# Patient Record
Sex: MALE | Race: WHITE | NOT HISPANIC OR LATINO | Employment: OTHER | URBAN - METROPOLITAN AREA
[De-identification: names, ages, dates, MRNs, and addresses within clinical notes are randomized per-mention and may not be internally consistent; named-entity substitution may affect disease eponyms.]

---

## 2017-09-07 ENCOUNTER — ALLSCRIPTS OFFICE VISIT (OUTPATIENT)
Dept: OTHER | Facility: OTHER | Age: 67
End: 2017-09-07

## 2017-09-07 DIAGNOSIS — R19.7 DIARRHEA: ICD-10-CM

## 2017-10-12 ENCOUNTER — ANESTHESIA EVENT (OUTPATIENT)
Dept: GASTROENTEROLOGY | Facility: HOSPITAL | Age: 67
End: 2017-10-12
Payer: OTHER GOVERNMENT

## 2017-10-13 ENCOUNTER — GENERIC CONVERSION - ENCOUNTER (OUTPATIENT)
Dept: OTHER | Facility: OTHER | Age: 67
End: 2017-10-13

## 2017-10-13 ENCOUNTER — ANESTHESIA (OUTPATIENT)
Dept: GASTROENTEROLOGY | Facility: HOSPITAL | Age: 67
End: 2017-10-13
Payer: OTHER GOVERNMENT

## 2017-10-13 ENCOUNTER — HOSPITAL ENCOUNTER (OUTPATIENT)
Facility: HOSPITAL | Age: 67
Setting detail: OUTPATIENT SURGERY
Discharge: HOME | End: 2017-10-13
Attending: INTERNAL MEDICINE | Admitting: INTERNAL MEDICINE
Payer: OTHER GOVERNMENT

## 2017-10-13 VITALS
HEART RATE: 66 BPM | RESPIRATION RATE: 16 BRPM | SYSTOLIC BLOOD PRESSURE: 132 MMHG | DIASTOLIC BLOOD PRESSURE: 69 MMHG | OXYGEN SATURATION: 95 %

## 2017-10-13 DIAGNOSIS — K21.9 GERD (GASTROESOPHAGEAL REFLUX DISEASE): ICD-10-CM

## 2017-10-13 LAB — GLUCOSE SERPL-MCNC: 183 MG/DL (ref 65–140)

## 2017-10-13 PROCEDURE — 88342 IMHCHEM/IMCYTCHM 1ST ANTB: CPT | Performed by: INTERNAL MEDICINE

## 2017-10-13 PROCEDURE — 88305 TISSUE EXAM BY PATHOLOGIST: CPT | Performed by: INTERNAL MEDICINE

## 2017-10-13 PROCEDURE — 88341 IMHCHEM/IMCYTCHM EA ADD ANTB: CPT | Performed by: INTERNAL MEDICINE

## 2017-10-13 PROCEDURE — 88313 SPECIAL STAINS GROUP 2: CPT | Performed by: INTERNAL MEDICINE

## 2017-10-13 PROCEDURE — 82948 REAGENT STRIP/BLOOD GLUCOSE: CPT

## 2017-10-13 RX ORDER — PROPOFOL 10 MG/ML
INJECTION, EMULSION INTRAVENOUS AS NEEDED
Status: DISCONTINUED | OUTPATIENT
Start: 2017-10-13 | End: 2017-10-13 | Stop reason: SURG

## 2017-10-13 RX ORDER — SODIUM CHLORIDE, SODIUM LACTATE, POTASSIUM CHLORIDE, CALCIUM CHLORIDE 600; 310; 30; 20 MG/100ML; MG/100ML; MG/100ML; MG/100ML
125 INJECTION, SOLUTION INTRAVENOUS CONTINUOUS
Status: DISCONTINUED | OUTPATIENT
Start: 2017-10-13 | End: 2017-10-13 | Stop reason: HOSPADM

## 2017-10-13 RX ORDER — PROPOFOL 10 MG/ML
INJECTION, EMULSION INTRAVENOUS CONTINUOUS PRN
Status: DISCONTINUED | OUTPATIENT
Start: 2017-10-13 | End: 2017-10-13 | Stop reason: SURG

## 2017-10-13 RX ORDER — SIMVASTATIN 10 MG
10 TABLET ORAL
COMMUNITY

## 2017-10-13 RX ORDER — FUROSEMIDE 20 MG/1
20 TABLET ORAL 2 TIMES DAILY
COMMUNITY

## 2017-10-13 RX ORDER — SODIUM CHLORIDE 9 MG/ML
INJECTION, SOLUTION INTRAVENOUS CONTINUOUS PRN
Status: DISCONTINUED | OUTPATIENT
Start: 2017-10-13 | End: 2017-10-13 | Stop reason: SURG

## 2017-10-13 RX ADMIN — PROPOFOL 20 MG: 10 INJECTION, EMULSION INTRAVENOUS at 08:00

## 2017-10-13 RX ADMIN — PROPOFOL 60 MG: 10 INJECTION, EMULSION INTRAVENOUS at 07:53

## 2017-10-13 RX ADMIN — PROPOFOL 40 MG: 10 INJECTION, EMULSION INTRAVENOUS at 07:57

## 2017-10-13 RX ADMIN — PROPOFOL 120 MCG/KG/MIN: 10 INJECTION, EMULSION INTRAVENOUS at 08:02

## 2017-10-13 RX ADMIN — PROPOFOL 40 MG: 10 INJECTION, EMULSION INTRAVENOUS at 07:55

## 2017-10-13 RX ADMIN — SODIUM CHLORIDE: 0.9 INJECTION, SOLUTION INTRAVENOUS at 07:49

## 2017-10-13 NOTE — OP NOTE
**** GI/ENDOSCOPY REPORT ****     PATIENT NAME: Savannah Lechuga - VISIT ID:  Patient ID: [id number]   YOB: 1950     INTRODUCTION: Esophagogastroduodenoscopy - A 74 male patient presents for   an outpatient Esophagogastroduodenoscopy at [facility]  INDICATIONS: Diffuse generalized abdominal pain  CONSENT: The benefits, risks, and alternatives to the procedure were   discussed and informed consent was obtained from the patient  PREPARATION:  EKG, pulse, pulse oximetry and blood pressure were monitored   throughout the procedure  MEDICATIONS: MAC anesthesia  PROCEDURE:  The endoscope was passed without difficulty through the mouth   under direct visualization and advanced to the 2nd portion of the   duodenum  The scope was withdrawn and the mucosa was carefully examined  FINDINGS:   Esophagus: The esophagus appeared to be normal   GE junction:   The GE junction appeared to be normal   Stomach: A small benign polyp was   found arising from the greater curvature of the stomach body  Mild   non-erosive gastritis was found in the stomach  Multiple biopsies was   taken from the antrum and body of the stomach  Pylorus: The pylorus   appeared to be normal, symmetrical, and patent  Duodenum: The duodenal   bulb and 2nd portion of the duodenum appeared to be normal  Five random   biopsies was taken from the duodenal bulb and 2nd portion of the duodenum  COMPLICATIONS: There were no complications  IMPRESSIONS: Normal esophagus  Normal GE junction  Polyp arising from the   greater curvature of the stomach body  Mild non-erosive gastritis found  Multiple biopsies taken  Normal, symmetrical, and patent pylorus  Normal   duodenal bulb and 2nd portion of the duodenum  RECOMMENDATIONS: Avoid all non-steroidal anti-inflammatory drugs (NSAID's)   including but not limited to Ibuprofen, Advil, Motrin, and Nuprin     Follow-up on the results of the biopsy specimens  ESTIMATED BLOOD LOSS:     PATHOLOGY SPECIMENS: Multiple biopsies taken from antrum and body of the   stomach  Associated finding: Gastritis  Five random biopsies taken from   the duodenal bulb and 2nd portion of the duodenum  PROCEDURE CODES: 98710 - EGD flexible; with biopsy     ICD-9 Codes: 789 00 Abdominal pain, unspecified site 211 1 Benign neoplasm   of stomach     ICD-10 Codes: R10 Abdominal and pelvic pain N04 6 Neoplasm of uncertain   behavior of stomach K29 Gastritis and duodenitis     PERFORMED BY: MABEL Bravo  on 10/13/2017  Version 1, electronically signed by MABEL Eagle  on 10/13/2017 at   08:04

## 2017-10-13 NOTE — ANESTHESIA PREPROCEDURE EVALUATION
Review of Systems/Medical History          Cardiovascular  Hypertension , Past MI ,    Pulmonary  Sleep apnea , ,        GI/Hepatic            Endo/Other  Diabetes ,      GYN       Hematology   Musculoskeletal       Neurology   Psychology           Physical Exam    Airway    Mallampati score: II         Dental   No notable dental hx     Cardiovascular      Pulmonary      Other Findings        Anesthesia Plan  ASA Score- 3       Anesthesia Type- IV sedation with anesthesia with ASA Monitors  Additional Monitors:   Airway Plan:     Comment: I, Dr Junior Kitchen, the attending physician, have personally seen and evaluated the patient prior to anesthetic care  I have reviewed the pre-anesthetic record, and other medical records if appropriate to the anesthetic care  If a CRNA is involved in the case, I have reviewed the CRNA assessment, if present, and agree  The patient is in a suitable condition to proceed with my formulated anesthetic plan          Induction- intravenous  Informed Consent- Anesthetic plan and risks discussed with patient  I personally reviewed this patient with the CRNA  Discussed and agreed on the Anesthesia Plan with the CRNA  Komal Boo

## 2017-10-13 NOTE — OP NOTE
**** GI/ENDOSCOPY REPORT ****     PATIENT NAME: Donovan Goodpasture ------ VISIT ID:  Patient ID:   [id number] YOB: 1950     INTRODUCTION: Colonoscopy - A 74 male patient presents for an outpatient   Colonoscopy at [facility]  PREVIOUS COLONOSCOPY: 3 years ago     INDICATIONS: Abdominal pain  Change in bowel habits  CONSENT:  The benefits, risks, and alternatives to the procedure were   discussed and informed consent was obtained from the patient  PREPARATION: EKG, pulse, pulse oximetry and blood pressure were monitored   throughout the procedure  The patient was identified by myself both   verbally and by visual inspection of ID band  Airway Assessment   Classification: Airway class 2 - Visualization of the soft palate, fauces   and uvula  ASA Classification: Class 2 - Patient has mild to moderate   systemic disturbance that may or may not be related to the disorder   requiring surgery  MEDICATIONS: Anesthesia-check records     PROCEDURE:  The endoscope was passed without difficulty through the anus   under direct visualization and advanced to the cecum, confirmed by   appendiceal orifice and ileocecal valve  The scope was withdrawn and the   mucosa was carefully examined  The quality of the preparation was   excellent  RECTAL EXAM: Normal rectal exam      FINDINGS:  A single polyp, measuring 3 mm in size, was found in the   descending colon  The polyp was completely removed by cold biopsy   polypectomy  The polyp was retrieved  A single polyp, measuring 6 mm in   size, was found in the sigmoid colon  The polyp was completely removed by   cold snare polypectomy  The polyp was retrieved  Five random cold forceps   biopsies was taken from the whole colon  Diminutive internal hemorrhoids   were found  COMPLICATIONS: There were no complications  IMPRESSIONS: A single polyp found in the descending colon; removed by cold   biopsy polypectomy   A single polyp found in the sigmoid colon; removed by   cold snare polypectomy  Internal hemorrhoids  RECOMMENDATIONS:     ESTIMATED BLOOD LOSS:     PATHOLOGY SPECIMENS: Completely removed by cold biopsy polypectomy  Completely removed by cold snare polypectomy  Five cold forceps random   biopsies taken from the whole colon  PROCEDURE CODES: Colonoscopy with biopsy : Colonoscopy with snare   polypectomy     ICD-9 Codes: 789 00 Abdominal pain, unspecified site 787 99 Other symptoms   involving digestive system 211 3 Benign neoplasm of colon 211 3 Benign   neoplasm of colon     ICD-10 Codes: R10 Abdominal and pelvic pain R19 4 Change in bowel habit   K63 5 Polyp of colon K63 5 Polyp of colon K64 9 Unspecified hemorrhoids     PERFORMED BY: MABEL Pierce  on  Version 1, electronically signed by MABEL Rios  on 10/13/2017 at   08:36

## 2017-10-13 NOTE — ANESTHESIA POSTPROCEDURE EVALUATION
Post-Op Assessment Note      CV Status:  Stable    Mental Status:  Alert and awake    Hydration Status:  Stable    PONV Controlled:  Controlled    Airway Patency:  Patent and adequate    Post Op Vitals Reviewed: Yes          Staff: CRNA           BP   118/62   Temp      Pulse  63   Resp   16   SpO2   96

## 2017-10-19 ENCOUNTER — GENERIC CONVERSION - ENCOUNTER (OUTPATIENT)
Dept: OTHER | Facility: OTHER | Age: 67
End: 2017-10-19

## 2017-10-23 DIAGNOSIS — A04.8 OTHER SPECIFIED BACTERIAL INTESTINAL INFECTIONS: ICD-10-CM

## 2017-10-25 NOTE — CONSULTS
Assessment  1  Left upper quadrant pain (789 02) (R10 12)  2  Left lower quadrant pain (789 04) (R10 32)  3  GERD (gastroesophageal reflux disease) (530 81) (K21 9)  4  Acute diarrhea (787 91) (R19 7)    Plan  Acute diarrhea    · (1) C  DIFFICILE TOXIN BY PCR; Status:Active; Requested RWK:[id number];   Perform:[facility]; VKU:[id number]; Ordered; For:Acute diarrhea; Ordered   By:Orlin Farmer;   · (1) CBC/PLT/DIFF; Status:Active; Requested IRN:[id number];   Perform:[facility]; IYZ:[id number]; Ordered; For:Acute diarrhea; Ordered   By:Orlin Farmer;   · (1) COMPREHENSIVE METABOLIC PANEL; Status:Active; Requested SPT:[id number];   Perform:[facility]; VTU:[id number]; Ordered; For:Acute diarrhea; Ordered   By:Orlin Farmer;   · (1) GIARDIA LAMBLIA; Status:Active; Requested CLK:[id number];   Perform:[facility]; SNO:[id number]; Ordered; For:Acute diarrhea; Ordered   By:Orlin Farmer;   · (1) HEP C RNA PCR, QUANTITATIVE; Status:Active; Requested RLS:[id number];   Perform:[facility]; PRD:[id number]; Ordered; For:Acute diarrhea; Ordered   By:Orlin Farmer;   · (1) IGA; Status:Active; Requested GLQ:[id number];   Perform:[facility]; HXJ:[id number]; Ordered; For:Acute diarrhea; Ordered   By:Orlin Farmer;   · (1) OVA AND PARASITES EXAM; Status:Active; Requested WDW:[id number];   Perform:[facility]; IGS:[id number]; Ordered; For:Acute diarrhea; Ordered   By:Orlin Farmer;   · (1) PANCREATIC ELASTASE, FECAL; Status:Active; Requested TUU:[id number];   Perform:[facility]; VJS:[id number]; Ordered; For:Acute diarrhea; Ordered   By:Orlin Farmer;   · (1) STOOL CULTURE; Source:Rectum; Status:Active; Requested UDR:[id number];   Perform:[facility]; SAY:[id number]; Ordered; For:Acute diarrhea; Ordered   By:Orlin Farmer;   · (1) STOOL WBCS; Status:Active; Requested OXE:[id number];   Perform:[facility]; SFF:[id number]; Ordered;  For:Acute diarrhea; Ordered   By:Marleny Orlin;   · (1) TISSUE TRANSGLUTAMINASE IGA; Status:Active; Requested HZS:[id number];   Perform:[facility]; NMZ:[id number]; Ordered; For:Acute diarrhea; Ordered   By:Orlin Farmer;   · (1) TSH; Status:Active; Requested MJO:[id number];   Perform:[facility]; DEJ:[id number]; Ordered; For:Acute diarrhea; Ordered   By:Orlin Farmer;  GERD (gastroesophageal reflux disease)    · Start: Suprep Bowel Prep Kit 17 5-3 13-1 6 GM/180ML Oral Solution; USE AS DIRECTED  Rx By: Calvin Irwin; Dispense: 0 Days ; #:1 ML; Refill: 0;For: GERD (gastroesophageal   reflux disease); ANGELO = N; Record   · COLONOSCOPY (GI, SURG); Status:Hold For - Scheduling; Requested IDL:[id number];   Perform:[facility]; OSR:[id number]; Ordered; For:GERD (gastroesophageal   reflux disease); Ordered By:Orlin Farmer;   · EGD; Status:Hold For - Scheduling; Requested QYI:[id number];   Perform:[facility]; Due:61Zou4750;Ordered; For:GERD (gastroesophageal   reflux disease); Ordered By:Orlin Farmer;    Discussion/Summary  Discussion Summary:   Abdominal pain in setting of change in bowel habits- in the left side of the abdomen DDx- constipation vs  ulcer vs  dyspepsia Plan EGD and colonoscopy for further eval if neg would consider Ct scan of A/PTrial of MiraLax and metamucil when patient has constipation Will obtain meds from [place] And emphasized bringing in cause medication to his next visit  1  of colonic polyps- last colonoscopy 3 years ago due for one in 2 years but will plan for earlier colonoscopy due to worsening abdominal pain   symptoms are managed at this time unclear if he is on PPi therapy   in bowel habits may be related to IBS versus mild constipation with overflow diarrhea versus infectious etiologyAs diarrhea is getting worse with about 10 bowel movements yesterday we'll plan for stool studies and EGD and colonoscopy for further evaluation         1 Amended By: Calvin Irwin; Sep 07 2017 8:59 AM EST    Chief Complaint  Chief Complaint Free Text Note Form: Patient consult  Complains of abdominal pain and constipation/diarrhea  History of Present Illness  HPI: Patient's a 53-year-old male poor historian presents with worsening diarrhea/change in bowel habits, left-sided abdominal pain, and history of colonic polyps  He has had 3 time lifetime colonoscopy evaluation with the last one done 3 years ago which showed colonic polyps  He was asked to come back in 3-5 years  Currently the biggest issue seems to be left-sided abdominal pain which is intermittent in nature which radiates to the epigastric region  Pain not clearly associated with bowel movements but can be triggered by oral intake  He denies any use of any NSAIDs  No overt bleeding  He has had EGD about 6 years ago but the results are not available to us  Currently unsure what the cause of his diarrhea is an also cannot recall his medications  He can have up to 10 bowel movements daily and at times has difficult time with evacuation and straining/constipation  Denies any fevers, sick contacts, recent consultation, recent antibiotic use, nausea, vomiting  No recent CAT scan evaluation  does have history of heartburn which may be related to his high BMI  Currently heartburn is well controlled with these unsure what medication he is taking so he may be on a PPI  Review of Systems  Complete-Male GI Adult:   Constitutional: No fever or chills, feels well, no tiredness, no recent weight gain or weight loss  Eyes: No complaints of eye pain, no red eyes, no discharge from eyes, no itchy eyes  ENT: no complaints of earache, no hearing loss, no nosebleeds, no nasal discharge, no sore throat, no hoarseness  Cardiovascular: No complaints of slow heart rate, no fast heart rate, no chest pain, no palpitations, no leg claudication, no lower extremity  Respiratory: No complaints of shortness of breath, no wheezing, no cough, no SOB on exertion, no orthopnea or PND     Gastrointestinal: abdominal pain,-- constipation-- and-- diarrhea, but-- as noted in HPI  Genitourinary: No complaints of dysuria, no incontinence, no hesitancy, no nocturia, no genital lesion, no testicular pain  Musculoskeletal: No complaints of arthralgia, no myalgias, no joint swelling or stiffness, no limb pain or swelling  Integumentary: No complaints of skin rash or skin lesions, no itching, no skin wound, no dry skin  Neurological: No compliants of headache, no confusion, no convulsions, no numbness or tingling, no dizziness or fainting, no limb weakness, no difficulty walking  Psychiatric: Is not suicidal, no sleep disturbances, no anxiety or depression, no change in personality, no emotional problems  Endocrine: No complaints of proptosis, no hot flashes, no muscle weakness, no erectile dysfunction, no deepening of the voice, no feelings of weakness  Hematologic/Lymphatic: No complaints of swollen glands, no swollen glands in the neck, does not bleed easily, no easy bruising  ROS Reviewed:   ROS reviewed  Past Medical History  1  History of colonic polyps (V12 72) (Z86 010)  Active Problems And Past Medical History Reviewed: The active problems and past medical history were reviewed and updated today  Surgical History  Surgical History Reviewed: The surgical history was reviewed and updated today  Family History  Mother   1  Family history of pancreatic cancer (V16 0) (Z80 0)  Father   2  Family history of gastric ulcer (V18 59) (Z83 79)  Family History Reviewed: The family history was reviewed and updated today  Social History   · Current non-drinker of alcohol (V49 89) (Z78 9)   · Former smoker (I41 17) (L23 106)  Social History Reviewed: The social history was reviewed and updated today  Current Meds  Medication List Reviewed: The medication list was reviewed and updated today  Allergies  1   Penicillins    Vitals  Vital Signs    Recorded: 49Moz9135 08:27AM   Heart Rate 72   Systolic 730, LUE, Sitting   Diastolic 78, LUE, Sitting   BP CUFF SIZE Large   Height 5 ft 8 in   Weight 251 lb    BMI Calculated 38 16   BSA Calculated 2 25   O2 Saturation 97     Physical Exam    Constitutional   General appearance: No acute distress, well appearing and well nourished  Eyes   Conjunctiva and lids: No swelling, erythema, or discharge  Ears, Nose, Mouth, and Throat   External inspection of ears and nose: Normal     Pulmonary   Respiratory effort: No increased work of breathing or signs of respiratory distress  Auscultation of lungs: Clear to auscultation, equal breath sounds bilaterally, no wheezes, no rales, no rhonci  Cardiovascular   Palpation of heart: Normal PMI, no thrills  Auscultation of heart: Normal rate and rhythm, normal S1 and S2, without murmurs  Neurologic   Cranial nerves: Cranial nerves 2-12 intact      Psychiatric   Orientation to person, place and time: Normal          Signatures   Electronically signed by : Latoya Babin MD; Sep  7 2017  8:58AM EST                       (Author)    Electronically signed by : Latoya Babin MD; Sep  7 2017  8:59AM EST                       (Author)

## 2018-01-12 NOTE — RESULT NOTES
Discussion/Summary   Removed polyps were precancerous (adenomas)  Colonoscopy in 5y   Bx of the stomach did identify H pylori  Please have our nurse treat this and follow up for eradication confirmation in the office  Patient also needs EGD in 1 year as intestinal metaplasia (abnormal cells due to H pylori) were identified to makes sure this resolves from H pylori has been treated     Verified Results  (1) TISSUE EXAM 13Oct2017 07:56AM Joya Brown     Test Name Result Flag Reference   LAB AP CASE REPORT (Report)     Surgical Pathology Report             Case: [id number]                   Authorizing Provider: Christi Butler MD      Collected:      10/13/2017 0756        Ordering Location:   [address] [place] [address]   Received:      10/13/2017 [address] [facility]                               Pathologist:      Kirby Yi MD                                 Specimens:  A) - Stomach, Body and antrum  r/o H pylori                              B) - Stomach, Gastric polyp                                      C) - Duodenum, R/O celiac                                       D) - Polyp, Colorectal, Descending colon  cold bx                           E) - Colon, Random biopsy  diarrhea  r/o microscopic colitis                      F) - Polyp, Colorectal, Sigmoid colon  cold snare   LAB AP FINAL DIAGNOSIS (Report)     A  Stomach, body and antrum (biopsy):  - 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   - H pylori are identified on immunohistochemistry stain  - No intestinal metaplasia     B  Stomach polyp (biopsy):  - Acutely and chronically inflamed polypoid mucosa with hyperplastic   changes  - No intestinal metaplasia     C  Duodenum (biopsy):  - Duodenal mucosa with no diagnostic abnormality  - No Marsh lesion  - Negative for dysplasia     D   Descending colon (biopsy):  - Tubular adenoma  - No high grade dysplasia     E  Colon, random (biopsy):  - Colonic mucosa with reactive lymphoid aggregate  - No acute, chronic or microscopic colitis  - Negative for dysplasia     F  Sigmoid colon, polyp (polypectomy):  - Tubular adenoma  - No high grade dysplasia       Electronically signed by Shree Varma MD on 10/18/2017 at 11:00 AM   LAB AP SURGICAL ADDITIONAL INFORMATION (Report)     All controls performed with the immunohistochemical stains reported above   reacted appropriately  These tests were developed and their performance   characteristics determined by [facility] or   [facility]  They may not be cleared or approved by the [place]  Food and Drug Administration  The FDA has determined that such clearance   or approval is not necessary  These tests are used for clinical purposes  They should not be regarded as investigational or for research  This   laboratory has been approved by [place] [id number], designated as a high-complexity   laboratory and is qualified to perform these tests  LAB AP GROSS DESCRIPTION (Report)     A  The specimen is received in formalin, labeled with the patient's name   and medical record number, and is designated body and antrum rule out H    pylori   The specimen consists of 3 rubbery tan tissue fragments each   measuring up to 0 2 cm in greatest dimension  Entirely submitted  One   cassette  B  The specimen is received in formalin, labeled with the patient's name   and medical record number, and is designated gastric polyp  The   specimen consists of a single, rubbery, tan-brown tissue fragment   measuring up to 0 2 cm in greatest dimension  Entirely submitted  One   cassette  C  The specimen is received in formalin, labeled with the patient's name   and medical record number, and is designated duodenum rule out celiac     The specimen consists of 3 rubbery tan tissue fragments measuring from   0 2 up to 0 3 cm in greatest dimension  Entirely submitted  One cassette  D   The specimen is received in formalin, labeled with the patient's name   and medical record number, and is designated descending colon biopsy  The specimen consists of several, rubbery, tan-brown tissue fragments   measuring 0 6 x 0 6 x 0 1 cm in aggregate dimension  Entirely submitted  One cassette  E  The specimen is received in formalin, labeled with the patient's name   and medical record number, and is designated random biopsy diarrhea rule   out microscopic colitis  The specimen consists of several, rubbery,   tan-brown tissue fragments measuring 0 5 x 0 4 x 0 1 cm in aggregate   dimension  Entirely submitted  One cassette  F  The specimen is received in formalin, labeled with the patient's name   and medical record number, and is designated   The specimen consists of   a single, rubbery, tan-brown tissue fragment measuring up to 0 3 cm in   greatest dimension  Entirely submitted  One cassette  Note: The estimated total formalin fixation time based upon information   provided by the submitting clinician and the standard processing schedule   is less than 72 hours  SRS   LAB AP CLINICAL INFORMATION (Report)     EGD  FINDINGS:  Esophagus: The esophagus appeared to be normal  GE junction:   The GE junction appeared to be normal  Stomach: A small benign polyp was   found arising from the greater curvature of the stomach body  Mild   non-erosive gastritis was found in the stomach  Multiple biopsies was   taken from the antrum and body of the stomach  Pylorus: The pylorus   appeared to be normal, symmetrical, and patent  Duodenum: The duodenal   bulb and 2nd portion of the duodenum appeared to be normal  Five random   biopsies was taken from the duodenal bulb and 2nd portion of the duodenum         Plan  Intestinal metaplasia of gastric mucosa    · EGD; Status:Hold For - Scheduling; Requested IRVIN:[id number];

## 2018-01-14 VITALS
HEART RATE: 72 BPM | WEIGHT: 251 LBS | SYSTOLIC BLOOD PRESSURE: 144 MMHG | OXYGEN SATURATION: 97 % | HEIGHT: 68 IN | DIASTOLIC BLOOD PRESSURE: 78 MMHG | BODY MASS INDEX: 38.04 KG/M2

## 2019-03-04 ENCOUNTER — TELEPHONE (OUTPATIENT)
Dept: GASTROENTEROLOGY | Facility: MEDICAL CENTER | Age: 69
End: 2019-03-04

## 2019-03-04 NOTE — TELEPHONE ENCOUNTER
Dr Farmer's patient called with bill questions from the office  Patient stated that it should be sent to [facility]   Patient can be reached at [phone]

## 2019-03-11 NOTE — TELEPHONE ENCOUNTER
I called Hospital billing, the claim for 10/13/17 was paid by [place] [phone], and balance was written off [phone]  Pt  Has a 0 00 balance  I called and left message for pt, he owes nothing

## 2019-04-23 ENCOUNTER — HOSPITAL ENCOUNTER (EMERGENCY)
Facility: HOSPITAL | Age: 69
Discharge: LEFT BEFORE DISCHARGE | End: 2019-04-23
Attending: EMERGENCY MEDICINE
Payer: OTHER GOVERNMENT

## 2019-04-23 ENCOUNTER — APPOINTMENT (EMERGENCY)
Dept: RADIOLOGY | Facility: HOSPITAL | Age: 69
End: 2019-04-23
Payer: OTHER GOVERNMENT

## 2019-04-23 VITALS
TEMPERATURE: 97.8 F | WEIGHT: 257.06 LBS | BODY MASS INDEX: 39.09 KG/M2 | RESPIRATION RATE: 24 BRPM | HEART RATE: 60 BPM | OXYGEN SATURATION: 95 % | SYSTOLIC BLOOD PRESSURE: 202 MMHG | DIASTOLIC BLOOD PRESSURE: 89 MMHG

## 2019-04-23 DIAGNOSIS — R06.02 SHORTNESS OF BREATH: Primary | ICD-10-CM

## 2019-04-23 DIAGNOSIS — I16.0 HYPERTENSIVE URGENCY: ICD-10-CM

## 2019-04-23 DIAGNOSIS — I10 UNCONTROLLED HYPERTENSION: ICD-10-CM

## 2019-04-23 DIAGNOSIS — Z91.19 NON COMPLIANCE WITH MEDICAL TREATMENT: ICD-10-CM

## 2019-04-23 DIAGNOSIS — E66.9 OBESITY: ICD-10-CM

## 2019-04-23 DIAGNOSIS — Z76.0 MEDICATION REFILL: ICD-10-CM

## 2019-04-23 LAB
BACTERIA UR QL AUTO: ABNORMAL /HPF
BASOPHILS # BLD AUTO: 0.02 THOUSANDS/ΜL (ref 0–0.1)
BASOPHILS NFR BLD AUTO: 0 % (ref 0–1)
BILIRUB UR QL STRIP: NEGATIVE
CLARITY UR: CLEAR
COLOR UR: YELLOW
EOSINOPHIL # BLD AUTO: 0.18 THOUSAND/ΜL (ref 0–0.61)
EOSINOPHIL NFR BLD AUTO: 3 % (ref 0–6)
ERYTHROCYTE [DISTWIDTH] IN BLOOD BY AUTOMATED COUNT: 14.3 % (ref 11.6–15.1)
GLUCOSE UR STRIP-MCNC: NEGATIVE MG/DL
HCT VFR BLD AUTO: 34.6 % (ref 36.5–49.3)
HGB BLD-MCNC: 11.4 G/DL (ref 12–17)
HGB UR QL STRIP.AUTO: NEGATIVE
IMM GRANULOCYTES # BLD AUTO: 0.01 THOUSAND/UL (ref 0–0.2)
IMM GRANULOCYTES NFR BLD AUTO: 0 % (ref 0–2)
KETONES UR STRIP-MCNC: NEGATIVE MG/DL
LEUKOCYTE ESTERASE UR QL STRIP: NEGATIVE
LYMPHOCYTES # BLD AUTO: 1.61 THOUSANDS/ΜL (ref 0.6–4.47)
LYMPHOCYTES NFR BLD AUTO: 26 % (ref 14–44)
MCH RBC QN AUTO: 30.8 PG (ref 26.8–34.3)
MCHC RBC AUTO-ENTMCNC: 32.9 G/DL (ref 31.4–37.4)
MCV RBC AUTO: 94 FL (ref 82–98)
MONOCYTES # BLD AUTO: 0.62 THOUSAND/ΜL (ref 0.17–1.22)
MONOCYTES NFR BLD AUTO: 10 % (ref 4–12)
NEUTROPHILS # BLD AUTO: 3.82 THOUSANDS/ΜL (ref 1.85–7.62)
NEUTS SEG NFR BLD AUTO: 61 % (ref 43–75)
NITRITE UR QL STRIP: NEGATIVE
NON-SQ EPI CELLS URNS QL MICRO: ABNORMAL /HPF
NRBC BLD AUTO-RTO: 0 /100 WBCS
PH UR STRIP.AUTO: 7 [PH] (ref 4.5–8)
PLATELET # BLD AUTO: 261 THOUSANDS/UL (ref 149–390)
PMV BLD AUTO: 10.8 FL (ref 8.9–12.7)
PROT UR STRIP-MCNC: ABNORMAL MG/DL
RBC # BLD AUTO: 3.7 MILLION/UL (ref 3.88–5.62)
RBC #/AREA URNS AUTO: ABNORMAL /HPF
SP GR UR STRIP.AUTO: 1.02 (ref 1–1.03)
UROBILINOGEN UR QL STRIP.AUTO: 0.2 E.U./DL
WBC # BLD AUTO: 6.26 THOUSAND/UL (ref 4.31–10.16)
WBC #/AREA URNS AUTO: ABNORMAL /HPF

## 2019-04-23 PROCEDURE — 93005 ELECTROCARDIOGRAM TRACING: CPT

## 2019-04-23 PROCEDURE — 81001 URINALYSIS AUTO W/SCOPE: CPT

## 2019-04-23 PROCEDURE — 85025 COMPLETE CBC W/AUTO DIFF WBC: CPT | Performed by: NURSE PRACTITIONER

## 2019-04-23 PROCEDURE — 99285 EMERGENCY DEPT VISIT HI MDM: CPT

## 2019-04-23 PROCEDURE — 71046 X-RAY EXAM CHEST 2 VIEWS: CPT

## 2019-04-23 PROCEDURE — 99284 EMERGENCY DEPT VISIT MOD MDM: CPT | Performed by: NURSE PRACTITIONER

## 2019-04-23 PROCEDURE — 36415 COLL VENOUS BLD VENIPUNCTURE: CPT | Performed by: NURSE PRACTITIONER

## 2019-04-23 RX ORDER — NIFEDIPINE 60 MG/1
60 TABLET, EXTENDED RELEASE ORAL DAILY
Qty: 5 TABLET | Refills: 0 | Status: SHIPPED | OUTPATIENT
Start: 2019-04-23 | End: 2019-04-28

## 2019-04-23 RX ORDER — EPLERENONE 50 MG/1
50 TABLET, FILM COATED ORAL DAILY
COMMUNITY

## 2019-04-23 RX ORDER — NIFEDIPINE 60 MG/1
60 TABLET, FILM COATED, EXTENDED RELEASE ORAL DAILY
COMMUNITY

## 2019-04-23 RX ORDER — TERAZOSIN 10 MG/1
10 CAPSULE ORAL
COMMUNITY

## 2019-04-23 RX ORDER — LOSARTAN POTASSIUM 50 MG/1
25 TABLET ORAL DAILY
COMMUNITY

## 2019-04-23 RX ORDER — NIFEDIPINE 60 MG/1
60 TABLET, EXTENDED RELEASE ORAL ONCE
Status: COMPLETED | OUTPATIENT
Start: 2019-04-23 | End: 2019-04-23

## 2019-04-23 RX ORDER — CARVEDILOL 12.5 MG/1
12.5 TABLET ORAL 2 TIMES DAILY WITH MEALS
COMMUNITY

## 2019-04-23 RX ADMIN — NIFEDIPINE 60 MG: 60 TABLET, FILM COATED, EXTENDED RELEASE ORAL at 21:57

## 2019-04-24 LAB
ATRIAL RATE: 59 BPM
P AXIS: 262 DEGREES
PR INTERVAL: 194 MS
QRS AXIS: 9 DEGREES
QRSD INTERVAL: 112 MS
QT INTERVAL: 414 MS
QTC INTERVAL: 409 MS
T WAVE AXIS: 54 DEGREES
VENTRICULAR RATE: 59 BPM

## 2019-04-24 PROCEDURE — 93010 ELECTROCARDIOGRAM REPORT: CPT | Performed by: INTERNAL MEDICINE

## 2022-10-11 ENCOUNTER — HOSPITAL ENCOUNTER (EMERGENCY)
Facility: HOSPITAL | Age: 72
Discharge: HOME | End: 2022-10-11
Attending: EMERGENCY MEDICINE
Payer: COMMERCIAL

## 2022-10-11 VITALS
HEART RATE: 88 BPM | DIASTOLIC BLOOD PRESSURE: 73 MMHG | OXYGEN SATURATION: 96 % | SYSTOLIC BLOOD PRESSURE: 131 MMHG | RESPIRATION RATE: 18 BRPM | TEMPERATURE: 98.2 F

## 2022-10-11 DIAGNOSIS — G89.29 ACUTE EXACERBATION OF CHRONIC LOW BACK PAIN: Primary | ICD-10-CM

## 2022-10-11 DIAGNOSIS — M54.50 ACUTE EXACERBATION OF CHRONIC LOW BACK PAIN: Primary | ICD-10-CM

## 2022-10-11 PROCEDURE — 99282 EMERGENCY DEPT VISIT SF MDM: CPT

## 2022-10-11 PROCEDURE — 99283 EMERGENCY DEPT VISIT LOW MDM: CPT

## 2022-10-11 RX ORDER — LIDOCAINE 40 MG/G
CREAM TOPICAL AS NEEDED
Qty: 30 G | Refills: 0 | Status: SHIPPED | OUTPATIENT
Start: 2022-10-11

## 2022-10-11 RX ORDER — ACETAMINOPHEN 325 MG/1
975 TABLET ORAL ONCE
Status: COMPLETED | OUTPATIENT
Start: 2022-10-11 | End: 2022-10-11

## 2022-10-11 RX ORDER — LIDOCAINE 50 MG/G
1 PATCH TOPICAL ONCE
Status: DISCONTINUED | OUTPATIENT
Start: 2022-10-11 | End: 2022-10-12 | Stop reason: HOSPADM

## 2022-10-11 RX ADMIN — ACETAMINOPHEN 975 MG: 325 TABLET, FILM COATED ORAL at 20:47

## 2022-10-11 RX ADMIN — LIDOCAINE 1 PATCH: 50 PATCH TOPICAL at 20:48

## 2022-10-11 NOTE — ED PROVIDER NOTES
History  Chief Complaint   Patient presents with   • Back Pain     Chronic lower back pain worsening beginning Friday  Pt reports pain radiating into b/l legs  C/o b/l leg weakness and unable to stand up today  Denies loss of bowel or bladder function     Pt is a 82-year-old male with a past medical history significant for HTN, HLD, T2DM on insulin and MI presenting to the ED with a complaint of acute on chronic lower back pain  Patient reports he has had lower back pain for some time now  States he has been seeing PT and the chiropractor with improvement of the symptoms over the last few months  States approximately 10 days ago he began with worsening pain with no known inciting event  Denies injury or trauma to the area  Currently rates the pain at a 5/10 with sitting however the pain is exacerbated to 8/10 with standing and ambulation  No pain medications PTA  Also reports subjective weakness in bilateral legs  Denies recent injury/trauma, saddle anesthesia, urinary incontinence, bowel incontinence, urinary retention, LE anesthesia, IVDU, history of cancer, unintentional weight loss, prolonged corticosteroid use, osteoporosis, and recent spinal surgery  Denies night pain, fever, chills, night sweats, LE swelling and LE erythema  Denies abdominal pain, N/V, urinary symptoms, CP, SOB, HA, confusion and any other complaint  Reports he has otherwise been well with out any other concerns  Prior to Admission Medications   Prescriptions Last Dose Informant Patient Reported? Taking?    NIFEdipine ER (ADALAT CC) 60 MG 24 hr tablet   Yes No   Sig: Take 60 mg by mouth daily   carvedilol (COREG) 12 5 mg tablet   Yes No   Sig: Take 12 5 mg by mouth 2 (two) times a day with meals   eplerenone (INSPRA) 50 MG tablet   Yes No   Sig: Take 50 mg by mouth daily   furosemide (LASIX) 20 mg tablet   Yes No   Sig: Take 20 mg by mouth 2 (two) times a day   insulin lispro (HumaLOG) 100 units/mL injection   Yes No Sig: Inject under the skin 2 (two) times a day   losartan (COZAAR) 50 mg tablet   Yes No   Sig: Take 25 mg by mouth daily   metFORMIN (GLUCOPHAGE) 500 mg tablet   Yes No   Sig: Take 1,000 mg by mouth 2 (two) times a day with meals    simvastatin (ZOCOR) 10 mg tablet   Yes No   Sig: Take 10 mg by mouth daily at bedtime   terazosin (HYTRIN) 10 MG capsule   Yes No   Sig: Take 10 mg by mouth daily at bedtime      Facility-Administered Medications: None       Past Medical History:   Diagnosis Date   • Colon polyps    • Diabetes mellitus ([facility] [address] )    • Hyperlipidemia    • Hypertension    • Myocardial infarction [facility])    • Sleep apnea        Past Surgical History:   Procedure Laterality Date   • COLONOSCOPY     • TN ESOPHAGOGASTRODUODENOSCOPY TRANSORAL DIAGNOSTIC N/A 10/13/2017    Procedure: EGD AND COLONOSCOPY;  Surgeon: Jenna Schaefer MD;  Location: [facility]; Service: Gastroenterology       History reviewed  No pertinent family history  I have reviewed and agree with the history as documented  E-Cigarette/Vaping     E-Cigarette/Vaping Substances     Social History     Tobacco Use   • Smoking status: Former Smoker   • Smokeless tobacco: Never Used   Substance Use Topics   • Alcohol use: No   • Drug use: No       Review of Systems   Constitutional: Negative for chills, fever and unexpected weight change  HENT: Negative for ear pain and sore throat  Eyes: Negative for pain and visual disturbance  Respiratory: Negative for cough and shortness of breath  Cardiovascular: Negative for chest pain, palpitations and leg swelling  Gastrointestinal: Negative for abdominal pain, diarrhea, nausea and vomiting  Genitourinary: Negative for difficulty urinating, dysuria, frequency and hematuria  Musculoskeletal: Positive for back pain  Negative for arthralgias and neck pain  Skin: Negative for color change and rash  Neurological: Negative for seizures, syncope and headaches     Psychiatric/Behavioral: Negative for confusion  All other systems reviewed and are negative  Physical Exam  Physical Exam  Vitals and nursing note reviewed  Constitutional:       General: He is not in acute distress  Appearance: Normal appearance  He is well-developed  He is obese  He is not ill-appearing or toxic-appearing  HENT:      Head: Normocephalic and atraumatic  Mouth/Throat:      Mouth: Mucous membranes are moist       Pharynx: Oropharynx is clear  Eyes:      Conjunctiva/sclera: Conjunctivae normal    Cardiovascular:      Rate and Rhythm: Normal rate and regular rhythm  Pulses:           Dorsalis pedis pulses are 2+ on the right side and 2+ on the left side  Heart sounds: No murmur heard  Comments: LE warm and well perfused  Pulmonary:      Effort: Pulmonary effort is normal  No respiratory distress  Breath sounds: Normal breath sounds  Abdominal:      Palpations: Abdomen is soft  Tenderness: There is no abdominal tenderness  There is no right CVA tenderness or left CVA tenderness  Musculoskeletal:      Cervical back: Neck supple  Back:       Right lower leg: No edema  Left lower leg: No edema  Comments: TTP where indicated  No midline TTP over the C/T/L spine  No paraspinal TTP over the C/T spine  No overlying skin changes  Sensation and motor intact in b/l LE  Strength 5/5 with hip flexion and extension b/l  5/5 with hip abduction and adduction b/l  5/5 with knee flexion and extension b/l  5/5 with ankle plantar- and dorsiflexion b/l  (-) SLR b/l  No TTP over b/l LE  No edema or signs of infection  Lymphadenopathy:      Cervical: No cervical adenopathy  Skin:     General: Skin is warm and dry  Capillary Refill: Capillary refill takes less than 2 seconds  Neurological:      General: No focal deficit present  Mental Status: He is alert           Vital Signs  ED Triage Vitals [10/11/22 1719]   Temperature Pulse Respirations Blood Pressure SpO2   98 2 °F (36 8 °C) 88 18 131/73 96 %      Temp Source Heart Rate Source Patient Position - Orthostatic VS BP Location FiO2 (%)   Oral Monitor Sitting Right arm --      Pain Score       7           Vitals:    10/11/22 1719   BP: 131/73   Pulse: 88   Patient Position - Orthostatic VS: Sitting         Visual Acuity      ED Medications  Medications   acetaminophen (TYLENOL) tablet 975 mg (975 mg Oral Given 10/11/22 2047)       Diagnostic Studies  Results Reviewed     None                 No orders to display              Procedures  Procedures         ED Course  ED Course as of 10/13/22 2226   Tue Oct 11, 2022   2141 Postvoid residual- WNL  Patient urinating appropriately without urinary retention  2142 Patient reports he feels well to go home  No new complaints at this time  The patient follow-up with the Comprehensive Spine program for re-evaluation and further management  Strict return precautions verbally communicated to the patient as outlined in the AVS   All patient questions and concerns were answered  Patient verbally communicated their understanding and agreement to the above plan  Patient stable at discharge                                             MDM  Number of Diagnoses or Management Options  Risk of Complications, Morbidity, and/or Mortality  General comments: DDx including but not limited to: sciatica, herniated disc, arthritis, spinal stenosis, strain, sprain  Doubt fracture, cauda equina syndrome, epidural abscess, AAA  Doubt fracture as pt with no recent injury or midline TTP over the C/T/L  No back pain red flags to indicate further imaging at this time  Pt reports weakness however has normal strength, motor and sensation on exam  DP pulses 2+ and LE are warm and well perfused  Able to ambulate in the ED without issue  States he feels safe to go home    Will have patient follow-up with his primary care provider and the Comprehensive Spine program in 2 days for re-evaluation and further management  Strict return precautions verbally communicated to the patient as outlined in the AVS   All patient questions and concerns were answered  Patient verbally communicated their understanding and agreement to the above plan  Patient stable at discharge  Patient Progress  Patient progress: stable      Disposition  Final diagnoses:   Acute exacerbation of chronic low back pain     Time reflects when diagnosis was documented in both MDM as applicable and the Disposition within this note     Time User Action Codes Description Comment    10/11/2022  7:49 PM Estill Krabbe Add [M54 50,  G89 29] Acute exacerbation of chronic low back pain       ED Disposition     ED Disposition   Discharge    Condition   Stable    Date/Time   Tue Oct 11, 2022  9:41 PM    Scotty Velez discharge to home/self care                 Follow-up Information     Follow up With Specialties Details Why Contact Info Additional Information    [facility] Physical Therapy Call in 1 day For further evaluation [phone] Emergency Department Emergency Medicine Go to  As needed, If symptoms worsen [place] [phone] [address] [facility] Emergency Department, [address] [place] [address], [id number]          Discharge Medication List as of 10/11/2022  9:43 PM      START taking these medications    Details   lidocaine (LMX) 4 % cream Apply topically as needed for mild pain, Starting Tue 10/11/2022, Print         CONTINUE these medications which have NOT CHANGED    Details   carvedilol (COREG) 12 5 mg tablet Take 12 5 mg by mouth 2 (two) times a day with meals, Historical Med      eplerenone (INSPRA) 50 MG tablet Take 50 mg by mouth daily, Historical Med      furosemide (LASIX) 20 mg tablet Take 20 mg by mouth 2 (two) times a day, Historical Med      insulin lispro (HumaLOG) 100 units/mL injection Inject under the skin 2 (two) times a day, Historical Med      losartan (COZAAR) 50 mg tablet Take 25 mg by mouth daily, Historical Med      metFORMIN (GLUCOPHAGE) 500 mg tablet Take 1,000 mg by mouth 2 (two) times a day with meals , Historical Med      NIFEdipine ER (ADALAT CC) 60 MG 24 hr tablet Take 60 mg by mouth daily, Historical Med      simvastatin (ZOCOR) 10 mg tablet Take 10 mg by mouth daily at bedtime, Historical Med      terazosin (HYTRIN) 10 MG capsule Take 10 mg by mouth daily at bedtime, Historical Med                 PDMP Review     None          ED Provider  Electronically Signed by           Alona Park PA-C  10/13/22 4412

## 2022-10-11 NOTE — DISCHARGE INSTRUCTIONS
Please return to the ED for any concerns as outlined in the AVS or for any other concerns  Please follow-up with your primary care provider and the Comprehensive [facility] program at the contact number provided in 2 days for re-evaluation and further management  Continue acetaminophen and lidocaine patches/gel as needed for symptomatic control  Lidocaine patch should be removed in 12 hours

## 2022-10-12 ENCOUNTER — NURSE TRIAGE (OUTPATIENT)
Dept: PHYSICAL THERAPY | Facility: OTHER | Age: 72
End: 2022-10-12

## 2022-10-12 DIAGNOSIS — M54.50 ACUTE EXACERBATION OF CHRONIC LOW BACK PAIN: Primary | ICD-10-CM

## 2022-10-12 DIAGNOSIS — G89.29 ACUTE EXACERBATION OF CHRONIC LOW BACK PAIN: Primary | ICD-10-CM

## 2022-10-12 NOTE — TELEPHONE ENCOUNTER
Additional Information  • Negative: Is this related to a work injury? • Negative: Is this related to an MVA? • Negative: Are you currently recieving homecare services? • Negative: Has the patient had unexplained weight loss? • Negative: Does the patient have a fever? • Negative: Is the patient experiencing blood in sputum? • Negative: Is the patient experiencing urine retention? • Negative: Is the patient experiencing acute drop foot or paralysis? • Negative: Has the patient experienced major trauma? (fall from height, high speed collision, direct blow to spine) and is also experiencing nausea, light-headedness, or loss of consciousness? • Affirmative: Is this a chronic condition? Background - Initial Assessment  Clinical complaint: left ,lower back which radiates down both legs L>R  States occasional numbness and tingling  States he has a history of back issues for many yrs and has don PT through [facility] this episode started 2 weeks ago  Date of onset: 2 weeks  Frequency of pain: constant  Quality of pain: sharp and stabbing    Protocols used: [facility] COMPREHENSIVE SPINE PROGRAM PROTOCOL    This RN did review in detail the Comprehensive [facility] Program and what we can provide for their back pain  Patient is agreeable to being triaged by this RN and would like to proceed with Physical Therapy  Referral was placed for Physical Therapy at the [place] site  Patients information was sent to the  to make evaluation appointment  Patient made aware that the PT office  will be calling to schedule the appointment  Patient was provided with the phone number to the PT office  No further questions and/or concerns were voiced by the patient at this time  Patient states understanding of the referral that was placed  Referral Closed

## 2022-10-20 ENCOUNTER — TELEPHONE (OUTPATIENT)
Dept: PAIN MEDICINE | Facility: CLINIC | Age: 72
End: 2022-10-20

## 2022-10-20 ENCOUNTER — EVALUATION (OUTPATIENT)
Dept: PHYSICAL THERAPY | Facility: REHABILITATION | Age: 72
End: 2022-10-20

## 2022-10-20 DIAGNOSIS — M54.50 ACUTE EXACERBATION OF CHRONIC LOW BACK PAIN: Primary | ICD-10-CM

## 2022-10-20 DIAGNOSIS — G89.29 ACUTE EXACERBATION OF CHRONIC LOW BACK PAIN: Primary | ICD-10-CM

## 2022-10-20 NOTE — TELEPHONE ENCOUNTER
[facility] to obtain Authorization information  Ref # [id number] and spoke to Vera Meyer  He said the Authorization on file is for emergency services dated 10/11/2022 and will be effective until 11/09/2022  He informed me that emergency Authorization will cover his appointments until November 09, 2022  He could not fax me a copy of it  I then called the [place], who Tee Hunt is connected with and they will be faxing me the copy to our direct fax  Authorization number is [id number]

## 2022-10-20 NOTE — PROGRESS NOTES
PT Evaluation     Today's date: 10/20/2022  Patient name: Arun Bal  : 1950  MRN: [id number]  Referring provider: Román Torres PT  Dx:   Encounter Diagnosis     ICD-10-CM    1  Acute exacerbation of chronic low back pain  M54 50 Ambulatory referral to PT spine    G89 29                   Assessment  Assessment details: Pt is a pleasant 81 y o  male presenting to outpatient physical therapy via the [facility] with Acute exacerbation of chronic low back pain  (primary encounter diagnosis)  Pt reports recently completing course of physical therapy over 3-4 months (total of 21 reported sessions), concluding treatment as recently as 3-4 weeks ago  In addition, notes he was also receiving treatment with a chiropractor, concluding treatment apporx 2-3 weeks ago  Pt notes no improvement with either  As a result, patient was interested and issued orders for Orthopedic and Pain Management consultations  These orders placed today and PT services will be placed on hold until he has these consultation appointments  Plan  Patient would benefit from: PT eval and skilled PT  Treatment plan discussed with: patient        Subjective Evaluation    History of Present Illness  Mechanism of injury: 10/20/22  Pt comes to therapy per referral from ED RE: chronic low back pain  States he has been having low back pain over the course of 30 years, however, has worsened over the past year  Pt states he recently went to the ED, due to low back pain  However, states he completed 21 sessions of PT with Lan Méndez this year, concluding about 3-4 weeks ago  Notes he had also been seeing a chiropractor for several treatments, however, finished treatments about 2-3 weeks ago  Denies notable relief or change in symptoms with either treatment  Pt appears confused as to why he was referred back to PT, given he had just completed 21 sessions recently           Objective Precautions: DM II, HTN, h/o MI    Daily Treatment Diary    Date 10/20            FOTO np            Re-Eval IE               Manuals                                                        Neuro Re-Ed                                                                                                Ther Ex                                                                                                            Ther Activity                              Gait Training                              Modalities

## 2022-10-20 NOTE — LETTER
2022    Arthur Salazar MD  [facility] [address]    Patient: Ni Gunderson  YOB: 1950   Date of Visit: 10/20/2022      Dear Dr Katharina Beaver:    One of your patients, Ni Gunderson, was referred to me by the [facility] program   Please see the evaluation summary attached below  If care is required beyond 30 days to help your patient reach their goals, I will send you a request for signature on the plan of care  If you have any questions or concerns, please don't hesitate to call  Sincerely,    Jad Kinney      Primary Care Provider:      I certify that I have read the below Plan of Care and certify the need for these services furnished under this plan of treatment while under my care  Arthur Salazar MD  [address] [facility]  Via Fax: [phone]           PT Evaluation     Today's date: 10/20/2022  Patient name: Ni Gunderson  : 1950  MRN: [id number]  Referring provider: Linda Velasco PT  Dx:   Encounter Diagnosis     ICD-10-CM    1  Acute exacerbation of chronic low back pain  M54 50 Ambulatory referral to PT spine    G89 29                   Assessment  Assessment details: Pt is a pleasant 81 y o  male presenting to outpatient physical therapy via the [facility] with Acute exacerbation of chronic low back pain  (primary encounter diagnosis)  Pt reports recently completing course of physical therapy over 3-4 months (total of 21 reported sessions), concluding treatment as recently as 3-4 weeks ago  In addition, notes he was also receiving treatment with a chiropractor, concluding treatment apporx 2-3 weeks ago  Pt notes no improvement with either  As a result, patient was interested and issued orders for Orthopedic and Pain Management consultations   These orders placed today and PT services will be placed on hold until he has these consultation appointments  Plan  Patient would benefit from: PT eval and skilled PT  Treatment plan discussed with: patient        Subjective Evaluation    History of Present Illness  Mechanism of injury: 10/20/22  Pt comes to therapy per referral from ED RE: chronic low back pain  States he has been having low back pain over the course of 30 years, however, has worsened over the past year  Pt states he recently went to the ED, due to low back pain  However, states he completed 21 sessions of PT with [facility] this year, concluding about 3-4 weeks ago  Notes he had also been seeing a chiropractor for several treatments, however, finished treatments about 2-3 weeks ago  Denies notable relief or change in symptoms with either treatment  Pt appears confused as to why he was referred back to PT, given he had just completed 21 sessions recently           Objective           Precautions: DM II, HTN, h/o MI    Daily Treatment Diary    Date 10/20            FOTO np            Re-Eval IE               Manuals                                                        Neuro Re-Ed                                                                                                Ther Ex                                                                                                            Ther Activity                              Gait Training                              Modalities

## 2022-10-24 NOTE — PROGRESS NOTES
Portions of the record may have been created with voice recognition software  Occasional wrong word or "sound a like" substitutions may have occurred due to the inherent limitations of voice recognition software  Read the chart carefully and recognize, using context, where substitutions have occurred  Assessment  1  Lumbar radiculitis    2  Acute exacerbation of chronic low back pain        Plan  No orders of the defined types were placed in this encounter  New Medications Ordered This Visit   Medications   • acetaminophen (TYLENOL) 650 mg CR tablet     Sig: Take 1 tablet (650 mg total) by mouth every 8 (eight) hours as needed for moderate pain     Dispense:  30 tablet     Refill:  0   • gabapentin (NEURONTIN) 100 mg capsule     Sig: Take 1 capsule (100 mg total) by mouth daily at bedtime     Dispense:  30 capsule     Refill:  [de-identified]      81year-old gentleman with past medical history as noted below but significant for diabetes with last hemoglobin A1c 8, chronic low back and bilateral leg pain  On physical exam today, positive facet loading and moderate tenderness to palpation to the left lumbar paraspinal region  Negative straight leg test bilaterally  No motor sensory deficits appreciated  Lumbar MRI at [facility] from July 2022 showing diffuse disc bulge with neural foraminal narrowing severe on the right at L4-L5 and severe on the left at L3-L4  Etiology of patient's pain presentation is likely multifactorial in setting of lumbar facet arthrosis as well as lumbar radiculitis secondary to neuroforaminal narrowing     - patient not interested in interventional options including epidural steroid injection at this time  He has had extensive physical therapy with mild improvement     -at this time will start gabapentin 100 mg nightly to help with his pain at night and lying down  We discussed starting gabapentin  The patient understands that this is a seizure medication that may be used for pain   Risks were discussed and included but were not limited to drowsiness, dizziness, loss of coordination, and blurred/double vision  The patient understands that if they have these side effects they should not operate heavy machinery or drive  The patient verbalizes understanding of the risks, benefits, and alternatives to care, and wishes to proceed  -he can continue taking Tylenol extra strength as needed as well as his other adjuvant agents including heat ice, topical creams and patches  [place] Prescription Drug Monitoring Program report was reviewed and was appropriate  and [place] Prescription Drug Monitoring Program report was reviewed and was appropriate     My impressions and treatment recommendations were discussed in detail with the patient who verbalized understanding and had no further questions  Discharge instructions were provided  I personally saw and examined the patient and I agree with the above discussed plan of care  History of Present Illness    Hayes Bailey is a 81 y o  male with past medical history of diabetes with last hemoglobin A1c 8 in August 2022, hypertension, MI, obesity and JENNIFER as well as chronic low back and bilateral leg pain for more than 5-10 years  Pt is referred to [facility] by Lakeshia Romo, PT      Patient presents today in motorized wheelchair noting that recently he went to the [address] [address] [facility] ED in October 11, 2022 for acute worsening of his bilateral low back and leg pain  He was referred to PT and given Tylenol which helped with the pain  Patient notes that he has had months of physical therapy at [facility] this year and was unsure why he was referred to PT again  Patient referred to us today  He is noting bilateral low back, left worse than right that radiates posterior bilateral lower extremity into the plantar aspect of bilateral feet    He notes that the pain is worse when he standing and gets better when sitting down   He denies any sensory or motor deficits associated with the pain  He rates the pain currently as severe 8/10 that is nearly constant 60-95% of time  He describes the pain as burning, pins and needles, pressure  In terms of management, patient notes he takes Tylenol extra strength which provided some relief  He has also tried physical therapy and chiropractic treatment for provided temporary relief  He notes that more than 10 years ago he had some injections in the back and he denies ever having spine surgery  Patient not interested in interventional therapy at this time    In terms of imaging, patient has a lumbar MRI from July 2022 at [facility] which shows mild scoliosis in the lumbar spine, L4-L5 disc space narrowing severe on the right side with mild spinal stenosis and severe right neuroforaminal narrowing  L5-S1 disc bulge central and left paracentral disc protrusion impressing ventral epidural fat  L3-L4 mild spinal stenosis and moderate to severe left neural foraminal narrowing  Patient denies any bowel bladder incontinence or any saddle anesthesia  No recent fevers chills or weight changes  No results found for: CREATININE last creatinine 1 21 in August 11 2022 at [facility]  Last AST 20  Last ALT 37  No results found for: ALT      Imaging:    MRI LUMBAR SPINE WO CONTRAST (07/27/2022 4:26 PM EDT)  Imaging Results - MRI LUMBAR SPINE WO CONTRAST (07/27/2022 4:26 PM EDT)  Anatomical Region Laterality Modality   L-spine, MRSPINE N/A Magnetic Resonance     Imaging Results - MRI LUMBAR SPINE WO CONTRAST (07/27/2022 4:26 PM EDT)  Specimen (Source) Anatomical Location / Laterality Collection Method / Volume Collection Time Received Time         07/28/2022 8:17 AM EDT       Imaging Results - MRI LUMBAR SPINE WO CONTRAST (07/27/2022 4:26 PM EDT)  Impressions   07/28/2022 8:34 AM EDT    IMPRESSION:    Mild scoliosis of lumbar spine    L3-L4 disc space narrowing severe on left side, left eccentric disc bulge, mild  spinal stenosis, moderate to severe left neuroforaminal narrowing  L4-L5 disc space narrowing severe on right side, right eccentric disc bulge,  mild spinal stenosis, severe right neuroforaminal narrowing  L5-S1 disc bulge, central and left paracentral disc protrusion impressing  ventral epidural fat  Please correlate clinically  VAS PHYSIOLOGIC ARTERIAL DOPPLER LOWER EXTREMITY SINGLE LEVEL BILATERAL (05/22/2020 10:10 AM EDT)  Imaging Results - VAS PHYSIOLOGIC ARTERIAL DOPPLER LOWER EXTREMITY SINGLE LEVEL BILATERAL (05/22/2020 10:10 AM EDT)  Anatomical Region Laterality Modality   Lower Extremity Bilateral Ultrasound     Imaging Results - VAS PHYSIOLOGIC ARTERIAL DOPPLER LOWER EXTREMITY SINGLE LEVEL BILATERAL (05/22/2020 10:10 AM EDT)  Specimen (Source) Anatomical Location / Laterality Collection Method / Volume Collection Time Received Time         05/22/2020 11:32 AM EDT       Imaging Results - VAS PHYSIOLOGIC ARTERIAL DOPPLER LOWER EXTREMITY SINGLE LEVEL BILATERAL (05/22/2020 10:10 AM EDT)  Impressions   05/22/2020 11:33 AM EDT    Impression:  No significant lower extremity arterial disease  Digital artery  pressures bilaterally predict greater than 90% when healing probability  Be aware that ankle/brachial indices are frequently factitiously elevated, thus  over-estimating perfusion, in patients with Diabetes mellitus and/or End Stage  Renal Disease due to medial arterial calcifications (Arterial calcinosis)  This Doppler study may complement, but does not replace, the physical exam;  clinical correlation is required      Ankle/Brachial Index Interpretation Levels:  >1 3 Noncompressible (Arterio calcinosis)  1 00-1 29 Normal  0 91-0 99 Borderline (equivocal)  0 41-0 90 Mild-to-Moderate PAD  <0 41 Severe PAD    Digital Pressure Interpretation levels:  <20mmHg=<29% ulcer healing probability  20-30mmHg=50% ulcer healing probability  >30mmHg=>90% ulcer healing probability  Add 20 mmHg to each value above for Diabetics  References: 1) American Association for Vascular Surgery/Society for Vascular  Surgery  2) Annals of Vascular Surgery, Vol 8, #1, pg99         No MRI cervical spine results found in the past 2 years   No MRI lumbar spine results found in the past 2 years   No MRI thoracic spine results found in the past 2 years   No X-ray cervical spine results found in the past 2 years   No X-ray lumbar spine results found in the past 2 years   No X-ray hip/pelvis results found in the past 2 years   No X-ray knee results found in the past 2 years         I have personally reviewed and/or updated the patient's past medical history, past surgical history, family history, social history, current medications, allergies, and vital signs today  Review of Systems   Musculoskeletal: Positive for back pain, gait problem and joint swelling  Both legs and both feet       There is no problem list on file for this patient  Past Medical History:   Diagnosis Date   • Colon polyps    • Diabetes mellitus ([facility] [address] )    • Hyperlipidemia    • Hypertension    • Myocardial infarction [facility])    • Sleep apnea        Past Surgical History:   Procedure Laterality Date   • COLONOSCOPY     • DC ESOPHAGOGASTRODUODENOSCOPY TRANSORAL DIAGNOSTIC N/A 10/13/2017    Procedure: EGD AND COLONOSCOPY;  Surgeon: Chelsea Reina MD;  [facility]: [facility]; Service: Gastroenterology       History reviewed  No pertinent family history      Social History     Occupational History   • Not on file   Tobacco Use   • Smoking status: Former Smoker   • Smokeless tobacco: Never Used   Vaping Use   • Vaping Use: Never used   Substance and Sexual Activity   • Alcohol use: No   • Drug use: No   • Sexual activity: Not Currently       Current Outpatient Medications on File Prior to Visit   Medication Sig   • carvedilol (COREG) 12 5 mg tablet Take 12 5 mg by mouth 2 (two) times a day with meals   • eplerenone (INSPRA) 50 MG tablet Take 50 mg by mouth daily   • furosemide (LASIX) 20 mg tablet Take 20 mg by mouth 2 (two) times a day   • insulin lispro (HumaLOG) 100 units/mL injection Inject under the skin 2 (two) times a day   • lidocaine (LMX) 4 % cream Apply topically as needed for mild pain   • losartan (COZAAR) 50 mg tablet Take 25 mg by mouth daily   • metFORMIN (GLUCOPHAGE) 500 mg tablet Take 1,000 mg by mouth 2 (two) times a day with meals    • NIFEdipine ER (ADALAT CC) 60 MG 24 hr tablet Take 60 mg by mouth daily   • simvastatin (ZOCOR) 10 mg tablet Take 10 mg by mouth daily at bedtime   • terazosin (HYTRIN) 10 MG capsule Take 10 mg by mouth daily at bedtime     No current facility-administered medications on file prior to visit  Allergies   Allergen Reactions   • Penicillins        Physical Exam    /72   Pulse 86   Ht 5' 8" (1 727 m) Comment: verbal  Wt 114 kg (251 lb 9 6 oz)   BMI 38 26 kg/m²     Palpation:  Moderate tenderness over the left paravertebral musculature  Mild tenderness over the right paravertebral musculature    No tenderness with palpation of the left SI joint  No tenderness with palpation of the right SI joint    No tenderness with palpation of the left greater trochanter  No tenderness with palpation of the right greater trochanter    Sensory:    Intact to light touch grossly in the left lower extremity  Intact to light touch grossly in the right lower extremity    Muscle Strength      Hip flexion Knee flexion Knee extension Foot plantarflexion Foot   dorsiflexion EHL   L 5/5 5/5 5/5 5/5 5/5 5/5   R 5/5 5/5 5/5 5/5 5/5 5/5     DTRs: 2+ patellar, 2+ Achilles and symmetric       Special Tests:     Facet loading Straight leg raise MICHELLE FAIR   L Positive Negative Negative    R Positive Negative Negative

## 2022-10-26 ENCOUNTER — CONSULT (OUTPATIENT)
Dept: PAIN MEDICINE | Facility: CLINIC | Age: 72
End: 2022-10-26
Payer: COMMERCIAL

## 2022-10-26 VITALS
HEIGHT: 68 IN | SYSTOLIC BLOOD PRESSURE: 126 MMHG | DIASTOLIC BLOOD PRESSURE: 72 MMHG | WEIGHT: 251.6 LBS | HEART RATE: 86 BPM | BODY MASS INDEX: 38.13 KG/M2

## 2022-10-26 DIAGNOSIS — G89.29 ACUTE EXACERBATION OF CHRONIC LOW BACK PAIN: ICD-10-CM

## 2022-10-26 DIAGNOSIS — M54.50 ACUTE EXACERBATION OF CHRONIC LOW BACK PAIN: ICD-10-CM

## 2022-10-26 DIAGNOSIS — M54.16 LUMBAR RADICULITIS: Primary | ICD-10-CM

## 2022-10-26 PROCEDURE — 99204 OFFICE O/P NEW MOD 45 MIN: CPT | Performed by: STUDENT IN AN ORGANIZED HEALTH CARE EDUCATION/TRAINING PROGRAM

## 2022-10-26 RX ORDER — GABAPENTIN 100 MG/1
100 CAPSULE ORAL
Qty: 30 CAPSULE | Refills: 0 | Status: SHIPPED | OUTPATIENT
Start: 2022-10-26

## 2022-10-26 RX ORDER — SENNOSIDES 8.6 MG
650 CAPSULE ORAL EVERY 8 HOURS PRN
Qty: 30 TABLET | Refills: 0 | Status: SHIPPED | OUTPATIENT
Start: 2022-10-26

## 2023-08-11 ENCOUNTER — OFFICE VISIT (OUTPATIENT)
Dept: OBGYN CLINIC | Facility: CLINIC | Age: 73
End: 2023-08-11

## 2023-08-11 VITALS
DIASTOLIC BLOOD PRESSURE: 60 MMHG | WEIGHT: 251 LBS | SYSTOLIC BLOOD PRESSURE: 141 MMHG | HEIGHT: 68 IN | BODY MASS INDEX: 38.04 KG/M2

## 2023-08-11 DIAGNOSIS — M17.0 PRIMARY OSTEOARTHRITIS OF BOTH KNEES: Primary | ICD-10-CM

## 2023-08-11 RX ORDER — MELOXICAM 7.5 MG/1
7.5 TABLET ORAL
COMMUNITY
Start: 2023-03-22

## 2023-08-11 RX ORDER — BUDESONIDE AND FORMOTEROL FUMARATE DIHYDRATE 80; 4.5 UG/1; UG/1
2 AEROSOL RESPIRATORY (INHALATION) 2 TIMES DAILY
COMMUNITY
Start: 2023-04-20

## 2023-08-11 NOTE — PROGRESS NOTES
Patient Name:  Norberto Gloria  MRN:  [id number]    Assessment & Plan     Bilateral knee DJD. 1. Continue conservative management with Tylenol. Patient also inquired about taking glucosamine and chondroitin. Advised he may try this and continue if he notices any improvement. 2. Patient reports a history of significantly increased blood glucose after undergoing an epidural steroid injection. He states his blood glucose reached 800. Advised against intra-articular corticosteroid injection at this time. 3. Briefly discussed hyaluronic acid injections for the knees. Patient wishes to proceed with these. Authorization process initiated. Follow-up once injections are approved. Chief Complaint     Bilateral knee pain    History of the Present Illness     Norberto Gloria is a 81 y.o. male who reports to the office today for evaluation of his bilateral knees. He notes a chronic history of bilateral knee pain which became worse over the past few weeks. He states his knee pain worsened after seeing his chiropractor for chronic back issues. He notes generalized bilateral knee pain with the left knee bothering him more than the right. Pain is worse with prolonged standing and walking and increased activity. He denies any significant stiffness or weakness. He denies any instability. He denies any swelling. He currently takes Tylenol with limited improvement. No numbness or tingling. No fevers or chills. Physical Exam     /60   Ht 5' 8" (1.727 m)   Wt 114 kg (251 lb)   BMI 38.16 kg/m²     Bilateral knees: Varus deformity is appreciated bilaterally. No erythema ecchymosis or swelling. No effusion. Diffuse generalized discomfort about the bilateral knees. Range of motion is extension -3 flexion 110 bilaterally. Stable to varus and valgus stress without pain bilaterally. Stable Lachman test.  Negative posterior drawer test.  Negative Kamryn's test.  Extensor mechanism intact.     Eyes: Anicteric sclerae. ENT: Trachea midline. Lungs: Normal respiratory effort. CV: Capillary refill is less than 2 seconds. Skin: Intact without erythema. Lymph: No palpable lymphadenopathy. Neuro: Sensation is grossly intact to light touch. Psych: Mood and affect are appropriate. Data Review     I have personally reviewed pertinent films in PACS, and my interpretation follows:    X-rays bilateral knees 7/5/2023: No acute osseous abnormality. No fracture or dislocation. Severe tricompartmental degenerative changes most severe in the medial compartments bilaterally. Past Medical History:   Diagnosis Date   • Colon polyps    • Diabetes mellitus ([address])    • Hyperlipidemia    • Hypertension    • Myocardial infarction [facility])    • Sleep apnea        Past Surgical History:   Procedure Laterality Date   • COLONOSCOPY     • [place] ESOPHAGOGASTRODUODENOSCOPY TRANSORAL DIAGNOSTIC N/A 10/13/2017    Procedure: EGD AND COLONOSCOPY;  Surgeon: Ekaterina Johnson MD;  Location: [facility];   Service: Gastroenterology       Allergies   Allergen Reactions   • Penicillins        Current Outpatient Medications on File Prior to Visit   Medication Sig Dispense Refill   • acetaminophen (TYLENOL) 650 mg CR tablet Take 1 tablet (650 mg total) by mouth every 8 (eight) hours as needed for moderate pain 30 tablet 0   • budesonide-formoterol (Symbicort) 80-4.5 MCG/ACT inhaler Inhale 2 puffs 2 (two) times a day     • carvedilol (COREG) 12.5 mg tablet Take 12.5 mg by mouth 2 (two) times a day with meals     • eplerenone (INSPRA) 50 MG tablet Take 50 mg by mouth daily     • furosemide (LASIX) 20 mg tablet Take 20 mg by mouth 2 (two) times a day     • gabapentin (NEURONTIN) 100 mg capsule Take 1 capsule (100 mg total) by mouth daily at bedtime 30 capsule 0   • insulin lispro (HumaLOG) 100 units/mL injection Inject under the skin 2 (two) times a day     • lidocaine (LMX) 4 % cream Apply topically as needed for mild pain 30 g 0   • losartan (COZAAR) 50 mg tablet Take 25 mg by mouth daily     • meloxicam (MOBIC) 7.5 mg tablet Take 7.5 mg by mouth     • metFORMIN (GLUCOPHAGE) 500 mg tablet Take 1,000 mg by mouth 2 (two) times a day with meals      • NIFEdipine ER (ADALAT CC) 60 MG 24 hr tablet Take 60 mg by mouth daily     • oxaprozin (DAYPRO) 600 MG tablet Take 1,200 mg by mouth daily     • semaglutide, 1 mg/dose, (Ozempic, 1 MG/DOSE,) 4 mg/3 mL injection pen Inject 1 Application under the skin     • simvastatin (ZOCOR) 10 mg tablet Take 10 mg by mouth daily at bedtime     • terazosin (HYTRIN) 10 MG capsule Take 10 mg by mouth daily at bedtime       No current facility-administered medications on file prior to visit. Social History     Tobacco Use   • Smoking status: Former   • Smokeless tobacco: Never   Vaping Use   • Vaping Use: Never used   Substance Use Topics   • Alcohol use: No   • Drug use: No       History reviewed. No pertinent family history. Review of Systems     As stated in the HPI. All other systems reviewed and are negative.

## 2023-08-22 ENCOUNTER — TELEPHONE (OUTPATIENT)
Dept: OBGYN CLINIC | Facility: CLINIC | Age: 73
End: 2023-08-22

## 2023-08-22 NOTE — TELEPHONE ENCOUNTER
[facility] I reached out to the patient about his Auth for his visco injections needing to be for bilateral knees instead of just right knee. The patient stated he is going to [facility] to get it all taken care of.

## 2024-07-10 ENCOUNTER — RA CDI HCC (OUTPATIENT)
Dept: OTHER | Facility: HOSPITAL | Age: 74
End: 2024-07-10

## 2024-07-15 ENCOUNTER — OFFICE VISIT (OUTPATIENT)
Dept: FAMILY MEDICINE CLINIC | Facility: CLINIC | Age: 74
End: 2024-07-15
Payer: COMMERCIAL

## 2024-07-15 VITALS
BODY MASS INDEX: 38.16 KG/M2 | HEIGHT: 68 IN | RESPIRATION RATE: 14 BRPM | HEART RATE: 96 BPM | SYSTOLIC BLOOD PRESSURE: 132 MMHG | DIASTOLIC BLOOD PRESSURE: 82 MMHG | OXYGEN SATURATION: 97 % | TEMPERATURE: 98.2 F

## 2024-07-15 DIAGNOSIS — I10 PRIMARY HYPERTENSION: ICD-10-CM

## 2024-07-15 DIAGNOSIS — E11.8 TYPE II DIABETES MELLITUS WITH MANIFESTATIONS (HCC): Primary | ICD-10-CM

## 2024-07-15 DIAGNOSIS — I73.9 CLAUDICATION (HCC): ICD-10-CM

## 2024-07-15 DIAGNOSIS — E78.5 HYPERLIPIDEMIA ASSOCIATED WITH TYPE 2 DIABETES MELLITUS  (HCC): ICD-10-CM

## 2024-07-15 DIAGNOSIS — F17.210 CIGARETTE SMOKER: ICD-10-CM

## 2024-07-15 DIAGNOSIS — M81.8 IDIOPATHIC OSTEOPOROSIS: ICD-10-CM

## 2024-07-15 DIAGNOSIS — N40.0 ENLARGED PROSTATE: ICD-10-CM

## 2024-07-15 DIAGNOSIS — E53.8 VITAMIN B12 DEFICIENCY: ICD-10-CM

## 2024-07-15 DIAGNOSIS — R14.0 BLOATING: ICD-10-CM

## 2024-07-15 DIAGNOSIS — Z12.11 SCREEN FOR COLON CANCER: ICD-10-CM

## 2024-07-15 DIAGNOSIS — E04.1 THYROID NODULE: ICD-10-CM

## 2024-07-15 DIAGNOSIS — I50.32 CHRONIC DIASTOLIC (CONGESTIVE) HEART FAILURE (HCC): ICD-10-CM

## 2024-07-15 DIAGNOSIS — J43.8 OTHER EMPHYSEMA (HCC): ICD-10-CM

## 2024-07-15 DIAGNOSIS — K21.9 GASTROESOPHAGEAL REFLUX DISEASE WITHOUT ESOPHAGITIS: ICD-10-CM

## 2024-07-15 DIAGNOSIS — M54.16 LUMBAR RADICULOPATHY: ICD-10-CM

## 2024-07-15 DIAGNOSIS — E11.69 HYPERLIPIDEMIA ASSOCIATED WITH TYPE 2 DIABETES MELLITUS  (HCC): ICD-10-CM

## 2024-07-15 DIAGNOSIS — Z11.59 NEED FOR HEPATITIS C SCREENING TEST: ICD-10-CM

## 2024-07-15 LAB — SL AMB POCT HEMOGLOBIN AIC: 7.2 (ref ?–6.5)

## 2024-07-15 PROCEDURE — 83036 HEMOGLOBIN GLYCOSYLATED A1C: CPT | Performed by: INTERNAL MEDICINE

## 2024-07-15 PROCEDURE — 93000 ELECTROCARDIOGRAM COMPLETE: CPT | Performed by: INTERNAL MEDICINE

## 2024-07-15 PROCEDURE — 99204 OFFICE O/P NEW MOD 45 MIN: CPT | Performed by: INTERNAL MEDICINE

## 2024-07-15 RX ORDER — LORATADINE 10 MG/1
10 TABLET ORAL DAILY
COMMUNITY

## 2024-07-15 RX ORDER — LATANOPROST 50 UG/ML
1 SOLUTION/ DROPS OPHTHALMIC
COMMUNITY

## 2024-07-15 RX ORDER — BUDESONIDE AND FORMOTEROL FUMARATE DIHYDRATE 80; 4.5 UG/1; UG/1
2 AEROSOL RESPIRATORY (INHALATION) 2 TIMES DAILY
Qty: 10.2 G | Refills: 3 | Status: SHIPPED | OUTPATIENT
Start: 2024-07-15

## 2024-07-15 RX ORDER — FERROUS SULFATE 325(65) MG
325 TABLET ORAL
COMMUNITY
End: 2024-07-15

## 2024-07-15 RX ORDER — TORSEMIDE 10 MG/1
10 TABLET ORAL DAILY
Qty: 100 TABLET | Refills: 3 | Status: SHIPPED | OUTPATIENT
Start: 2024-07-15 | End: 2024-07-15 | Stop reason: SDUPTHER

## 2024-07-15 RX ORDER — SILODOSIN 8 MG/1
8 CAPSULE ORAL
Qty: 100 CAPSULE | Refills: 3 | Status: SHIPPED | OUTPATIENT
Start: 2024-07-15 | End: 2024-07-15 | Stop reason: SDUPTHER

## 2024-07-15 RX ORDER — TORSEMIDE 10 MG/1
10 TABLET ORAL DAILY
Qty: 100 TABLET | Refills: 3 | Status: SHIPPED | OUTPATIENT
Start: 2024-07-15

## 2024-07-15 RX ORDER — CARVEDILOL 12.5 MG/1
12.5 TABLET ORAL 2 TIMES DAILY WITH MEALS
Qty: 200 TABLET | Refills: 3 | Status: SHIPPED | OUTPATIENT
Start: 2024-07-15 | End: 2024-07-15 | Stop reason: SDUPTHER

## 2024-07-15 RX ORDER — VALSARTAN 80 MG/1
80 TABLET ORAL DAILY
Qty: 100 TABLET | Refills: 3 | Status: SHIPPED | OUTPATIENT
Start: 2024-07-15

## 2024-07-15 RX ORDER — ROSUVASTATIN CALCIUM 5 MG/1
5 TABLET, COATED ORAL DAILY
Qty: 100 TABLET | Refills: 3 | Status: SHIPPED | OUTPATIENT
Start: 2024-07-15

## 2024-07-15 RX ORDER — CARVEDILOL 12.5 MG/1
12.5 TABLET ORAL 2 TIMES DAILY WITH MEALS
Qty: 200 TABLET | Refills: 3 | Status: SHIPPED | OUTPATIENT
Start: 2024-07-15

## 2024-07-15 RX ORDER — TRIAMCINOLONE ACETONIDE 1 MG/G
CREAM TOPICAL 2 TIMES DAILY
COMMUNITY

## 2024-07-15 RX ORDER — KETOCONAZOLE 20 MG/ML
SHAMPOO TOPICAL ONCE
COMMUNITY

## 2024-07-15 RX ORDER — SILDENAFIL 100 MG/1
100 TABLET, FILM COATED ORAL DAILY PRN
COMMUNITY

## 2024-07-15 RX ORDER — ROSUVASTATIN CALCIUM 5 MG/1
5 TABLET, COATED ORAL DAILY
Qty: 100 TABLET | Refills: 3 | Status: SHIPPED | OUTPATIENT
Start: 2024-07-15 | End: 2024-07-15 | Stop reason: SDUPTHER

## 2024-07-15 RX ORDER — IPRATROPIUM BROMIDE AND ALBUTEROL SULFATE 2.5; .5 MG/3ML; MG/3ML
3 SOLUTION RESPIRATORY (INHALATION) ONCE
Status: COMPLETED | OUTPATIENT
Start: 2024-07-15 | End: 2024-07-15

## 2024-07-15 RX ORDER — PANTOPRAZOLE SODIUM 40 MG/1
40 TABLET, DELAYED RELEASE ORAL DAILY
COMMUNITY

## 2024-07-15 RX ORDER — ATORVASTATIN CALCIUM 40 MG/1
40 TABLET, FILM COATED ORAL DAILY
COMMUNITY
End: 2024-07-15

## 2024-07-15 RX ORDER — VALSARTAN 80 MG/1
80 TABLET ORAL DAILY
Qty: 100 TABLET | Refills: 3 | Status: SHIPPED | OUTPATIENT
Start: 2024-07-15 | End: 2024-07-15 | Stop reason: SDUPTHER

## 2024-07-15 RX ORDER — BUDESONIDE AND FORMOTEROL FUMARATE DIHYDRATE 80; 4.5 UG/1; UG/1
2 AEROSOL RESPIRATORY (INHALATION) 2 TIMES DAILY
Qty: 10.2 G | Refills: 3 | Status: SHIPPED | OUTPATIENT
Start: 2024-07-15 | End: 2024-07-15 | Stop reason: SDUPTHER

## 2024-07-15 RX ORDER — SILODOSIN 8 MG/1
8 CAPSULE ORAL
Qty: 100 CAPSULE | Refills: 3 | Status: SHIPPED | OUTPATIENT
Start: 2024-07-15

## 2024-07-15 RX ORDER — SELENIUM SULFIDE 2.5 MG/100ML
LOTION TOPICAL DAILY PRN
COMMUNITY

## 2024-07-15 RX ORDER — OLOPATADINE HYDROCHLORIDE 1 MG/ML
1 SOLUTION/ DROPS OPHTHALMIC 2 TIMES DAILY
COMMUNITY

## 2024-07-15 RX ADMIN — IPRATROPIUM BROMIDE AND ALBUTEROL SULFATE 3 ML: 2.5; .5 SOLUTION RESPIRATORY (INHALATION) at 10:58

## 2024-07-15 NOTE — PATIENT INSTRUCTIONS

## 2024-07-15 NOTE — PROGRESS NOTES
Ambulatory Visit  Name: Hayes Osman      : 1950      MRN: [id number]  Encounter Provider: Ken Caldwell MD  Encounter Date: 7/15/2024   Encounter department: [facility]    Assessment & Plan   1. 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)  -     POCT hemoglobin A1c  -     POCT ECG  -     Echo complete w/ contrast if indicated; Future; Expected date: 07/15/2024  -     Comprehensive metabolic panel; Future; Expected date: 07/15/2024  -     CBC and differential; Future; Expected date: 07/15/2024  -     Lipid Panel with Direct LDL reflex; Future; Expected date: 07/15/2024  -     TSH, 3rd generation with Free T4 reflex; Future; Expected date: 07/15/2024  -     UA (URINE) with reflex to Scope; Future  -     Magnesium; Future  -     PSA Total, Diagnostic; Future  -     Vitamin B12; Future  -     Vitamin D 25 hydroxy; Future; Expected date: 07/15/2024  -     DXA bone density spine hip and pelvis; Future; Expected date: 07/15/2024  -     MRI lumbar spine wo contrast; Future; Expected date: 07/15/2024  -     metFORMIN (GLUCOPHAGE) 1000 MG tablet; Take 1 tablet (1,000 mg total) by mouth 2 (two) times a day with meals  -     NIFEdipine ER (ADALAT CC) 60 MG 24 hr tablet; Take 1 tablet (60 mg total) by mouth daily  -     semaglutide, 2 mg/dose, (Ozempic) 8 mg/ mL injection pen; Inject 0.75 mL (2 mg total) under the skin every 7 days  2. Need for hepatitis C screening test  -     Hepatitis C Antibody; Future  3. Other emphysema (HCC)  -     Complete PFT with post bronchodilator; Future  -     budesonide-formoterol (Symbicort) 80-4.5 MCG/ACT inhaler; Inhale 2 puffs 2 (two) times a day  -     ipratropium-albuterol (DUO-NEB) 0.5-2.5 mg/3 mL inhalation solution 3 mL  4. Bloating  -     IgE; Future  -     Ferritin; Future  -     PSA Total, Diagnostic; Future  -     Food Allergy Profile; Future  -     Allergy Evaluation , [place]; Future  -     Celiac Disease Panel;  Future  -     Gluten IgE; Future  -     IgA; Future  5. Claudication (HCC)  -     MRI lumbar spine wo contrast; Future; Expected date: 07/15/2024  6. Lumbar radiculopathy  7. Screen for colon cancer  -     Ambulatory referral to Gastroenterology; Future; Expected date: 07/15/2024  8. Gastroesophageal reflux disease without esophagitis  -     H. pylori antigen, stool; Future  9. Cigarette smoker  -     CT lung screening program; Future; Expected date: 07/15/2024  -     Echo complete w/ contrast if indicated; Future; Expected date: 07/15/2024  -     MRI lumbar spine wo contrast; Future; Expected date: 07/15/2024  10. Thyroid nodule  -     TSH, 3rd generation; Future; Expected date: 07/15/2024  -     T4, free; Future; Expected date: 07/15/2024  -     Thyroid Antibodies Panel; Future  -     US thyroid; Future; Expected date: 07/15/2024  -     Comprehensive metabolic panel; Future; Expected date: 07/15/2024  -     CBC and differential; Future; Expected date: 07/15/2024  -     Lipid Panel with Direct LDL reflex; Future; Expected date: 07/15/2024  -     TSH, 3rd generation with Free T4 reflex; Future; Expected date: 07/15/2024  11. Hyperlipidemia associated with type 2 diabetes mellitus  (HCC)  -     POCT ECG  -     Echo complete w/ contrast if indicated; Future; Expected date: 07/15/2024  -     Comprehensive metabolic panel; Future; Expected date: 07/15/2024  -     CBC and differential; Future; Expected date: 07/15/2024  -     Lipid Panel with Direct LDL reflex; Future; Expected date: 07/15/2024  -     TSH, 3rd generation with Free T4 reflex; Future; Expected date: 07/15/2024  -     NIFEdipine ER (ADALAT CC) 60 MG 24 hr tablet; Take 1 tablet (60 mg total) by mouth daily  -     rosuvastatin (CRESTOR) 5 mg tablet; Take 1 tablet (5 mg total) by mouth daily  12. Chronic diastolic (congestive) heart failure (HCC)  -     Comprehensive metabolic panel; Future; Expected date: 07/15/2024  -     CBC and differential; Future;  Expected date: 07/15/2024  -     Lipid Panel with Direct LDL reflex; Future; Expected date: 07/15/2024  -     TSH, 3rd generation with Free T4 reflex; Future; Expected date: 07/15/2024  -     carvedilol (COREG) 12.5 mg tablet; Take 1 tablet (12.5 mg total) by mouth 2 (two) times a day with meals  -     NIFEdipine ER (ADALAT CC) 60 MG 24 hr tablet; Take 1 tablet (60 mg total) by mouth daily  -     torsemide (DEMADEX) 10 mg tablet; Take 1 tablet (10 mg total) by mouth daily  13. Enlarged prostate  -     PSA Total, Diagnostic; Future  -     Silodosin 8 MG CAPS; Take 1 capsule by mouth daily at bedtime  14. Primary hypertension  -     NIFEdipine ER (ADALAT CC) 60 MG 24 hr tablet; Take 1 tablet (60 mg total) by mouth daily  -     valsartan (DIOVAN) 80 mg tablet; Take 1 tablet (80 mg total) by mouth daily  15. Idiopathic osteoporosis  -     Vitamin D 25 hydroxy; Future; Expected date: 07/15/2024  -     DXA bone density spine hip and pelvis; Future; Expected date: 07/15/2024  16. Vitamin B12 deficiency  -     cyanocobalamin (VITAMIN B-12) 1000 MCG tablet; Take 1 tablet (1,000 mcg total) by mouth daily  Life style mod  RTC in 1 mo w Blood work...       History of Present Illness     73 Y O Man is here for First time in my Office, Complete H/P Discussed w Pt., he has increased Claudication lately,....        Review of Systems   Constitutional:  Negative for chills, fatigue and fever.   HENT:  Positive for postnasal drip. Negative for congestion, facial swelling, sore throat, trouble swallowing and voice change.    Eyes:  Negative for pain, discharge and visual disturbance.   Respiratory:  Positive for shortness of breath. Negative for cough and wheezing.    Cardiovascular:  Negative for chest pain, palpitations and leg swelling.   Gastrointestinal:  Positive for abdominal pain and nausea. Negative for blood in stool, constipation and diarrhea.   Endocrine: Negative for polydipsia, polyphagia and polyuria.   Genitourinary:   "Negative for difficulty urinating, hematuria and urgency.   Musculoskeletal:  Positive for arthralgias and back pain. Negative for myalgias.   Skin:  Negative for rash.   Neurological:  Positive for dizziness and numbness. Negative for tremors, weakness and headaches.   Hematological:  Negative for adenopathy. Does not bruise/bleed easily.   Psychiatric/Behavioral:  Negative for dysphoric mood, sleep disturbance and suicidal ideas.        Objective     /82 (BP Location: Left arm, Patient Position: Sitting, Cuff Size: Standard)   Pulse 96   Temp 98.2 °F (36.8 °C) (Tympanic)   Resp 14   Ht 5' 8\" (1.727 m)   SpO2 97%   BMI 38.16 kg/m²     Physical Exam  Vitals and nursing note reviewed.   Constitutional:       General: He is not in acute distress.     Appearance: He is well-developed.   HENT:      Head: Normocephalic and atraumatic.      Right Ear: External ear normal.      Left Ear: External ear normal.   Eyes:      Extraocular Movements: EOM normal.      Conjunctiva/sclera: Conjunctivae normal.      Pupils: Pupils are equal, round, and reactive to light.   Neck:      Thyroid: No thyromegaly.      Trachea: No tracheal deviation.   Cardiovascular:      Rate and Rhythm: Normal rate and regular rhythm.      Heart sounds: Murmur heard.      No friction rub.   Pulmonary:      Effort: Pulmonary effort is normal. No respiratory distress.      Breath sounds: Normal breath sounds. No wheezing.   Abdominal:      General: Bowel sounds are normal. There is no distension.      Palpations: Abdomen is soft.      Tenderness: There is no abdominal tenderness.      Comments: Bloating   Musculoskeletal:         General: Tenderness present. No swelling, deformity or edema. Normal range of motion.      Cervical back: Neck supple.   Skin:     General: Skin is warm and dry.      Capillary Refill: Capillary refill takes less than 2 seconds.      Findings: No erythema or rash.   Neurological:      Mental Status: He is alert and " oriented to person, place, and time.      Cranial Nerves: No cranial nerve deficit.      Sensory: Sensory deficit present.      Coordination: Coordination normal.      Deep Tendon Reflexes: Reflexes normal.   Psychiatric:         Mood and Affect: Mood and affect and mood normal.         Behavior: Behavior normal.       Administrative Statements

## 2024-08-02 RX ORDER — ALBUTEROL SULFATE 2.5 MG/3ML
2.5 SOLUTION RESPIRATORY (INHALATION) ONCE AS NEEDED
Status: COMPLETED | OUTPATIENT
Start: 2024-08-02 | End: 2024-08-05

## 2024-08-05 ENCOUNTER — HOSPITAL ENCOUNTER (OUTPATIENT)
Dept: PULMONOLOGY | Facility: HOSPITAL | Age: 74
Discharge: HOME | End: 2024-08-05
Payer: COMMERCIAL

## 2024-08-05 DIAGNOSIS — J43.8 OTHER EMPHYSEMA (HCC): ICD-10-CM

## 2024-08-05 PROCEDURE — 94729 DIFFUSING CAPACITY: CPT | Performed by: INTERNAL MEDICINE

## 2024-08-05 PROCEDURE — 94060 EVALUATION OF WHEEZING: CPT | Performed by: INTERNAL MEDICINE

## 2024-08-05 PROCEDURE — 94729 DIFFUSING CAPACITY: CPT

## 2024-08-05 PROCEDURE — 94060 EVALUATION OF WHEEZING: CPT

## 2024-08-05 PROCEDURE — 94726 PLETHYSMOGRAPHY LUNG VOLUMES: CPT | Performed by: INTERNAL MEDICINE

## 2024-08-05 PROCEDURE — 94760 N-INVAS EAR/PLS OXIMETRY 1: CPT

## 2024-08-05 PROCEDURE — 94726 PLETHYSMOGRAPHY LUNG VOLUMES: CPT

## 2024-08-05 RX ADMIN — ALBUTEROL SULFATE 2.5 MG: 2.5 SOLUTION RESPIRATORY (INHALATION) at 10:21

## 2024-08-15 ENCOUNTER — HOSPITAL ENCOUNTER (OUTPATIENT)
Dept: ULTRASOUND IMAGING | Facility: HOSPITAL | Age: 74
Discharge: HOME | End: 2024-08-15
Payer: COMMERCIAL

## 2024-08-15 DIAGNOSIS — E04.1 THYROID NODULE: ICD-10-CM

## 2024-08-15 PROCEDURE — 76536 US EXAM OF HEAD AND NECK: CPT

## 2024-08-21 ENCOUNTER — TELEPHONE (OUTPATIENT)
Age: 74
End: 2024-08-21

## 2024-08-21 DIAGNOSIS — E04.1 THYROID NODULE: Primary | ICD-10-CM

## 2024-08-21 NOTE — TELEPHONE ENCOUNTER
Patient called stating he received a call to schedule a thyroid biopsy and told them he wasn't ready to schedule that he wanted to speak to Dr Caldwell about this first and find out why this is needed . Please return patient call with explanation as to why he needs this procedure/test completed .

## 2024-08-21 NOTE — TELEPHONE ENCOUNTER
Spoke with the patient.  Went over results of U/S Thyroid and need to have Thyroid needle BX.  Explained the results in detail.  He will come to the office to  a copy of the results and the order for the test.  He wants to have this done at the [facility] because it won't cost him anything.

## 2024-08-29 ENCOUNTER — APPOINTMENT (OUTPATIENT)
Dept: LAB | Facility: HOSPITAL | Age: 74
End: 2024-08-29
Payer: COMMERCIAL

## 2024-08-29 DIAGNOSIS — K21.9 GASTROESOPHAGEAL REFLUX DISEASE WITHOUT ESOPHAGITIS: ICD-10-CM

## 2024-08-29 DIAGNOSIS — E11.69 HYPERLIPIDEMIA ASSOCIATED WITH TYPE 2 DIABETES MELLITUS  (HCC): ICD-10-CM

## 2024-08-29 DIAGNOSIS — R14.0 BLOATING: ICD-10-CM

## 2024-08-29 DIAGNOSIS — E78.5 HYPERLIPIDEMIA ASSOCIATED WITH TYPE 2 DIABETES MELLITUS  (HCC): ICD-10-CM

## 2024-08-29 DIAGNOSIS — N40.0 ENLARGED PROSTATE: ICD-10-CM

## 2024-08-29 DIAGNOSIS — Z11.59 NEED FOR HEPATITIS C SCREENING TEST: ICD-10-CM

## 2024-08-29 DIAGNOSIS — M81.8 IDIOPATHIC OSTEOPOROSIS: ICD-10-CM

## 2024-08-29 DIAGNOSIS — E11.8 TYPE II DIABETES MELLITUS WITH MANIFESTATIONS (HCC): ICD-10-CM

## 2024-08-29 DIAGNOSIS — E04.1 THYROID NODULE: ICD-10-CM

## 2024-08-29 DIAGNOSIS — I50.32 CHRONIC DIASTOLIC (CONGESTIVE) HEART FAILURE (HCC): ICD-10-CM

## 2024-08-29 LAB
25(OH)D3 SERPL-MCNC: 25.3 NG/ML (ref 30–100)
ALBUMIN SERPL BCG-MCNC: 4.6 G/DL (ref 3.5–5)
ALP SERPL-CCNC: 94 U/L (ref 34–104)
ALT SERPL W P-5'-P-CCNC: 25 U/L (ref 7–52)
ANION GAP SERPL CALCULATED.3IONS-SCNC: 11 MMOL/L (ref 4–13)
AST SERPL W P-5'-P-CCNC: 21 U/L (ref 13–39)
BACTERIA UR QL AUTO: ABNORMAL /HPF
BASOPHILS # BLD AUTO: 0.02 THOUSANDS/ÂΜL (ref 0–0.1)
BASOPHILS NFR BLD AUTO: 0 % (ref 0–1)
BILIRUB SERPL-MCNC: 0.43 MG/DL (ref 0.2–1)
BILIRUB UR QL STRIP: NEGATIVE
BUN SERPL-MCNC: 20 MG/DL (ref 5–25)
CALCIUM SERPL-MCNC: 9.6 MG/DL (ref 8.4–10.2)
CAOX CRY URNS QL MICRO: ABNORMAL /HPF
CHLORIDE SERPL-SCNC: 100 MMOL/L (ref 96–108)
CHOLEST SERPL-MCNC: 109 MG/DL
CLARITY UR: CLEAR
CO2 SERPL-SCNC: 28 MMOL/L (ref 21–32)
COLOR UR: ABNORMAL
CREAT SERPL-MCNC: 1.07 MG/DL (ref 0.6–1.3)
EOSINOPHIL # BLD AUTO: 0.17 THOUSAND/ÂΜL (ref 0–0.61)
EOSINOPHIL NFR BLD AUTO: 3 % (ref 0–6)
ERYTHROCYTE [DISTWIDTH] IN BLOOD BY AUTOMATED COUNT: 14.8 % (ref 11.6–15.1)
FERRITIN SERPL-MCNC: 12 NG/ML (ref 24–336)
GFR SERPL CREATININE-BSD FRML MDRD: 69 ML/MIN/1.73SQ M
GLIADIN PEPTIDE IGA SER-ACNC: 1.2 U/ML
GLIADIN PEPTIDE IGA SER-ACNC: NEGATIVE
GLIADIN PEPTIDE IGG SER-ACNC: <0.4 U/ML
GLIADIN PEPTIDE IGG SER-ACNC: NEGATIVE
GLUCOSE P FAST SERPL-MCNC: 85 MG/DL (ref 65–99)
GLUCOSE UR STRIP-MCNC: ABNORMAL MG/DL
HCT VFR BLD AUTO: 41.1 % (ref 36.5–49.3)
HDLC SERPL-MCNC: 46 MG/DL
HGB BLD-MCNC: 13.3 G/DL (ref 12–17)
HGB UR QL STRIP.AUTO: NEGATIVE
IGA SERPL-MCNC: 315 MG/DL (ref 66–433)
IMM GRANULOCYTES # BLD AUTO: 0.02 THOUSAND/UL (ref 0–0.2)
IMM GRANULOCYTES NFR BLD AUTO: 0 % (ref 0–2)
KETONES UR STRIP-MCNC: NEGATIVE MG/DL
LDLC SERPL CALC-MCNC: 25 MG/DL (ref 0–100)
LEUKOCYTE ESTERASE UR QL STRIP: NEGATIVE
LYMPHOCYTES # BLD AUTO: 1.6 THOUSANDS/ÂΜL (ref 0.6–4.47)
LYMPHOCYTES NFR BLD AUTO: 23 % (ref 14–44)
MAGNESIUM SERPL-MCNC: 1.9 MG/DL (ref 1.9–2.7)
MCH RBC QN AUTO: 30.2 PG (ref 26.8–34.3)
MCHC RBC AUTO-ENTMCNC: 32.4 G/DL (ref 31.4–37.4)
MCV RBC AUTO: 93 FL (ref 82–98)
MONOCYTES # BLD AUTO: 0.67 THOUSAND/ÂΜL (ref 0.17–1.22)
MONOCYTES NFR BLD AUTO: 10 % (ref 4–12)
NEUTROPHILS # BLD AUTO: 4.37 THOUSANDS/ÂΜL (ref 1.85–7.62)
NEUTS SEG NFR BLD AUTO: 64 % (ref 43–75)
NITRITE UR QL STRIP: NEGATIVE
NON-SQ EPI CELLS URNS QL MICRO: ABNORMAL /HPF
NRBC BLD AUTO-RTO: 0 /100 WBCS
PH UR STRIP.AUTO: 5 [PH]
PLATELET # BLD AUTO: 329 THOUSANDS/UL (ref 149–390)
PMV BLD AUTO: 10.7 FL (ref 8.9–12.7)
POTASSIUM SERPL-SCNC: 3.5 MMOL/L (ref 3.5–5.3)
PROT SERPL-MCNC: 8.1 G/DL (ref 6.4–8.4)
PROT UR STRIP-MCNC: ABNORMAL MG/DL
PSA SERPL-MCNC: 0.66 NG/ML (ref 0–4)
RBC # BLD AUTO: 4.4 MILLION/UL (ref 3.88–5.62)
RBC #/AREA URNS AUTO: ABNORMAL /HPF
SODIUM SERPL-SCNC: 139 MMOL/L (ref 135–147)
SP GR UR STRIP.AUTO: 1.01 (ref 1–1.04)
T4 FREE SERPL-MCNC: 0.93 NG/DL (ref 0.61–1.12)
TRIGL SERPL-MCNC: 189 MG/DL
TSH SERPL DL<=0.05 MIU/L-ACNC: 1.12 UIU/ML (ref 0.45–4.5)
TTG IGA SER-ACNC: <0.5 U/ML
TTG IGA SER-ACNC: NEGATIVE
TTG IGG SER-ACNC: 1 U/ML
TTG IGG SER-ACNC: NEGATIVE
UROBILINOGEN UA: NEGATIVE MG/DL
VIT B12 SERPL-MCNC: 485 PG/ML (ref 180–914)
WBC # BLD AUTO: 6.85 THOUSAND/UL (ref 4.31–10.16)
WBC #/AREA URNS AUTO: ABNORMAL /HPF

## 2024-08-29 PROCEDURE — 36415 COLL VENOUS BLD VENIPUNCTURE: CPT

## 2024-08-29 PROCEDURE — 82785 ASSAY OF IGE: CPT

## 2024-08-29 PROCEDURE — 86800 THYROGLOBULIN ANTIBODY: CPT

## 2024-08-29 PROCEDURE — 85025 COMPLETE CBC W/AUTO DIFF WBC: CPT

## 2024-08-29 PROCEDURE — 82784 ASSAY IGA/IGD/IGG/IGM EACH: CPT

## 2024-08-29 PROCEDURE — 83735 ASSAY OF MAGNESIUM: CPT

## 2024-08-29 PROCEDURE — 86803 HEPATITIS C AB TEST: CPT

## 2024-08-29 PROCEDURE — 81001 URINALYSIS AUTO W/SCOPE: CPT

## 2024-08-29 PROCEDURE — 86258 DGP ANTIBODY EACH IG CLASS: CPT

## 2024-08-29 PROCEDURE — 84443 ASSAY THYROID STIM HORMONE: CPT

## 2024-08-29 PROCEDURE — 84439 ASSAY OF FREE THYROXINE: CPT

## 2024-08-29 PROCEDURE — 86376 MICROSOMAL ANTIBODY EACH: CPT

## 2024-08-29 PROCEDURE — 82306 VITAMIN D 25 HYDROXY: CPT

## 2024-08-29 PROCEDURE — 82607 VITAMIN B-12: CPT

## 2024-08-29 PROCEDURE — 86003 ALLG SPEC IGE CRUDE XTRC EA: CPT

## 2024-08-29 PROCEDURE — 80053 COMPREHEN METABOLIC PANEL: CPT

## 2024-08-29 PROCEDURE — 80061 LIPID PANEL: CPT

## 2024-08-29 PROCEDURE — 86364 TISS TRNSGLTMNASE EA IG CLAS: CPT

## 2024-08-29 PROCEDURE — 84153 ASSAY OF PSA TOTAL: CPT

## 2024-08-29 PROCEDURE — 82728 ASSAY OF FERRITIN: CPT

## 2024-08-30 ENCOUNTER — TELEPHONE (OUTPATIENT)
Dept: FAMILY MEDICINE CLINIC | Facility: CLINIC | Age: 74
End: 2024-08-30

## 2024-08-30 DIAGNOSIS — R79.0 LOW FERRITIN: Primary | ICD-10-CM

## 2024-08-30 DIAGNOSIS — E55.9 VITAMIN D DEFICIENCY: ICD-10-CM

## 2024-08-30 LAB
A ALTERNATA IGE QN: <0.1 KUA/I
A FUMIGATUS IGE QN: <0.1 KUA/I
BERMUDA GRASS IGE QN: <0.1 KUA/I
BOXELDER IGE QN: <0.1 KUA/I
C HERBARUM IGE QN: <0.1 KUA/I
CAT DANDER IGE QN: <0.1 KUA/I
CMN PIGWEED IGE QN: <0.1 KUA/I
COMMON RAGWEED IGE QN: <0.1 KUA/I
COTTONWOOD IGE QN: <0.1 KUA/I
D FARINAE IGE QN: <0.1 KUA/I
D PTERONYSS IGE QN: <0.1 KUA/I
DOG DANDER IGE QN: <0.1 KUA/I
HCV AB SER QL: NORMAL
LONDON PLANE IGE QN: <0.1 KUA/I
MOUSE URINE PROT IGE QN: <0.1 KUA/I
MT JUNIPER IGE QN: <0.1 KUA/I
MUGWORT IGE QN: <0.1 KUA/I
P NOTATUM IGE QN: <0.1 KUA/I
ROACH IGE QN: <0.1 KUA/I
SHEEP SORREL IGE QN: <0.1 KUA/I
SILVER BIRCH IGE QN: <0.1 KUA/I
THYROGLOB AB SERPL-ACNC: <1 IU/ML (ref 0–0.9)
THYROPEROXIDASE AB SERPL-ACNC: 12 IU/ML (ref 0–34)
TIMOTHY IGE QN: <0.1 KUA/I
TOTAL IGE SMQN RAST: 8.73 KU/L (ref 0–113)
WALNUT IGE QN: <0.1 KUA/I
WHITE ASH IGE QN: <0.1 KUA/I
WHITE ELM IGE QN: <0.1 KUA/I
WHITE MULBERRY IGE QN: <0.1 KUA/I
WHITE OAK IGE QN: <0.1 KUA/I

## 2024-08-30 RX ORDER — ERGOCALCIFEROL 1.25 MG/1
50000 CAPSULE, LIQUID FILLED ORAL WEEKLY
Qty: 12 CAPSULE | Refills: 2 | Status: SHIPPED | OUTPATIENT
Start: 2024-08-30

## 2024-08-30 NOTE — TELEPHONE ENCOUNTER
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 Rasmussen is a 63 year old male is being followed for history of left TVF SCC. he was initially seen on 19. Please refer to this note for full history.    Today, he presents for follow up. he reports:  - no pain  - voice is ok  - swallow is ok  - breathing is ok  - no complaints    Dysphonia: Patient reports dysphonia. This is stable      Dysphagia: Patient reports dysphagia. This is stable     Dyspnea: Patient denies dyspnea. This is stable     Throat clearing/Chronic cough: Patient denies throat clearing/chronic cough. This is stable     LPRD/GERD: Patient denies LPRD/GERD symptoms. This is stable     PAST MEDICAL HISTORY:   Past Medical History:   Diagnosis Date     Alcohol abuse, unspecified      Cerebral infarction (H)     , right side residual and aphasia     Dyslipidemia      GERD (gastroesophageal reflux disease)      Lung cancer (H)      Unspecified essential hypertension        PAST SURGICAL HISTORY:   Past Surgical History:   Procedure Laterality Date     BRONCHOSCOPY FLEXIBLE AND RIGID N/A 2016    Procedure: BRONCHOSCOPY FLEXIBLE AND RIGID;  Surgeon: Tony Talbot MD;  Location:  OR     ESOPHAGOSCOPY FLEXIBLE N/A 2019    Procedure: flexible esophagoscopy;  Surgeon: Lizzy Johnson MD;  Location:  OR     INJECT STEROID (LOCATION) N/A 2019    Procedure: steroid injection;  Surgeon: Lizzy Johnson MD;  Location:  OR     LASER CO2 LARYNGOSCOPY N/A 2019    Procedure: Microdirect laryngoscopy with excision of laryngeal mass, CO2 laser;  Surgeon: Lizzy Johnson MD;  Location:  OR     [facility]  ----- Message from Ken Caldwell MD sent at 8/30/2024 12:14 PM EDT -----  Gi and Hematology consults ASAP, also; repeat Clean catch UA, and Vit d supplements  ----- Message -----  From: Lab, Background User  Sent: 8/29/2024  10:31 AM EDT  To: Ken Caldwell MD   NONSPECIFIC PROCEDURE      R tympanoplasty       CURRENT MEDICATIONS:   Current Outpatient Medications:      atorvastatin (LIPITOR) 40 MG tablet, Take 40 mg by mouth daily, Disp: , Rfl:     ALLERGIES: No known drug allergies    SOCIAL HISTORY:    Social History     Socioeconomic History     Marital status: Single     Spouse name: Not on file     Number of children: Not on file     Years of education: Not on file     Highest education level: Not on file   Occupational History     Not on file   Social Needs     Financial resource strain: Not on file     Food insecurity     Worry: Not on file     Inability: Not on file     Transportation needs     Medical: Not on file     Non-medical: Not on file   Tobacco Use     Smoking status: Former Smoker     Packs/day: 1.00     Types: Cigarettes     Quit date: 2009     Years since quittin.3     Smokeless tobacco: Never Used   Substance and Sexual Activity     Alcohol use: Not Currently     Drug use: No     Sexual activity: Not on file   Lifestyle     Physical activity     Days per week: Not on file     Minutes per session: Not on file     Stress: Not on file   Relationships     Social connections     Talks on phone: Not on file     Gets together: Not on file     Attends Yazdanism service: Not on file     Active member of club or organization: Not on file     Attends meetings of clubs or organizations: Not on file     Relationship status: Not on file     Intimate partner violence     Fear of current or ex partner: Not on file     Emotionally abused: Not on file     Physically abused: Not on file     Forced sexual activity: Not on file   Other Topics Concern     Parent/sibling w/ CABG, MI or angioplasty before 65F 55M? Not Asked   Social History Narrative     Not on file         FAMILY HISTORY:   Family History   Problem Relation Age of Onset     Cancer Father       Non-contributory for problems with anesthesia    REVIEW OF SYSTEMS:   The patient was asked a 14 point  review of systems regarding constitutional symptoms, eye symptoms, ears, nose, mouth, throat symptoms, cardiovascular symptoms, respiratory symptoms, gastrointestinal symptoms, genitourinary symptoms, musculoskeletal symptoms, integumentary symptoms, neurological symptoms, psychiatric symptoms, endocrine symptoms, hematologic/lymphatic symptoms, and allergic/ immunologic symptoms.   The pertinent factors have been included in the HPI and below.  Patient Supplied Answers to Review of Systems  No flowsheet data found.    Physical Examination   The patient underwent a physical examination as described below. The pertinent positive and negative findings are summarized after the description of the examination.  Constitutional: The patient's developmental and nutritional status was assessed. The patient's voice quality was assessed.  Head and Face: The head and face were inspected for deformities. The sinuses were palpated. The salivary glands were palpated. Facial muscle strength was assessed bilaterally.  Eyes: Extraocular movements and primary gaze alignment were assessed.  Ears, Nose, Mouth and Throat: The ears and nose were examined for deformities. The nasal septum, mucosa, and turbinates were inspected by anterior rhinoscopy. The lips, teeth, and gums were examined for abnormalities. The oral mucosa, tongue, palate, tonsils, lateral and posterior pharynx were inspected for the presence of asymmetry or mucosal lesions.    Neck: The tracheal position was noted, and the neck mass palpated to determine if there were any asymmetries, abnormal neck masses, thyromegally, or thyroid nodules.  Respiratory: The nature of the breathing and chest expansion/symmetry was observed.  Cardiovascular: The patient was examined to determine the presence of any edema or jugular venous distension.  Abdomen: The contour of the abdomen was noted.  Lymphatic: The patient was examined for infraclavicular lymphadenopathy.  Musculoskeletal:  The patient was inspected for the presence of skeletal deformities.  Extremities: The extremities were examined for any clubbing or cyanosis.  Skin: The skin was examined for inflammatory or neoplastic conditions.  Neurologic: The patient's orientation, mood, and affect were noted. The cranial nerve  functions were examined.  Other pertinent positive and negative findings on physical examination:   OC/OP: no lesions, uvula midline, soft palate elevates symmetrically, FOM/BOT soft  Neck: no lesions, no TH tenderness to palpation    All other physical examination findings were within normal limits and noncontributory.    Procedures   Flexible laryngoscopy (CPT 63473)      Pre-procedure diagnosis: larynx scc  Post-procedure diagnosis: same as above  Indication for procedure: Mr. Rasmussen is a 63 year old male with see above  Procedure(s): Fiberoptic Laryngoscopy    Details of Procedure: After informed consent was obtained, the patient was seated in the examination chair.  The areas of the nasopharynx as well as the hypopharynx were anesthetized with topical 4% lidocaine with 0.25% phenylephrine atomizer.  Examination of the base of tongue was performed first.  Attention was directed to any evidence of masses in the area or evidence of leukoplakia or candidal infection.  Attention was directed to the epiglottis where its size and position was determined and its movement on phonation of the vowel  e .  The piriform sinuses were then inspected for any mass lesions or pooling of secretions.  Attention was then directed to the larynx. The vocal folds were inspected for infection or any areas of leukoplakia, for masses, polypoid degeneration, or hemorrhage.  Having done this, the arytenoids and vocal processes were inspected for erythema or evidence of granuloma formation.  The posterior commissure was then inspected for evidence of inflammatory changes in the mucosa and heaping up of mucosal tissue. The patient was then  instructed to say the vowel  e .  Adduction of vocal folds to the midline was observed for any evidence of paresis or paralysis of the larynx or asymmetry in rotation of the larynx to the left or right. The patient was asked to breathe and the degree of abduction was noted bilaterally.  Subglottic view of the larynx was obtained for any additional mass lesions or mucosal changes.  Finally the post cricoid was examined for evidence of pooling of secretions, as well as the pharyngeal wall mucosa.   Anesthesia type: 0.25% phenylephrine    Findings:  Anatomic/physiological deviations: well healed left vocal fold no evidence of recurrence   Right vocal process: No restriction of mobility   Left vocal process: No restriction of mobility  Glottal gap: Glottal gap  Supraglottic structures: Normal  Hypopharynx: Normal     Estimated Blood Loss: minimal  Complications: None  Disposition: Patient tolerated the procedure well            Review of Relevant Clinical Data      Labs:  Lab Results   Component Value Date    TSH 1.05 11/13/2007     Lab Results   Component Value Date     09/25/2019    CO2 29 09/25/2019    BUN 16 09/25/2019    PHOS 2.0 (L) 05/04/2016     Lab Results   Component Value Date    WBC 4.6 09/25/2019    HGB 12.9 (L) 09/25/2019    HCT 41.2 09/25/2019    MCV 95 09/25/2019     (L) 09/25/2019     Lab Results   Component Value Date    PTT 35 07/26/2017    INR 0.95 07/26/2017     No results found for: ELIZABET  No components found for: RHEUMATOIDFACTOR,  RF  Lab Results   Component Value Date    CRP 0.16 07/24/2003     No components found for: CKTOT, URICACID  No components found for: C3, C4, DSDNAAB, NDNAABIFA  No results found for: MPOAB    Patient reported Quality of Life (QOL) Measures   Patient Supplied Answers To VHI Questionnaire  Voice Handicap Index (VHI-10) 12/5/2019   My voice makes it difficult for people to hear me 0   People have difficulty understanding me in a noisy room 0   My voice  "difficulties restrict my personal and social life.  0   I feel left out of conversations because of my voice 0   My voice problem causes me to lose income 0   I feel as though I have to strain to produce voice 0   The clarity of my voice is unpredictable 0   My voice problem upsets me 0   My voice makes me feel handicapped 0   People ask, \"What's wrong with your voice?\" 0   VHI-10 0         Patient Supplied Answers To EAT Questionnaire  Eating Assessment Tool (EAT-10) 2019   My swallowing problem has caused me to lose weight 0   My swallowing problem interferes with my ability to go out for meals 0   Swallowing liquids takes extra effort 0   Swallowing solids takes extra effort 0   Swallowing pills takes extra effort 0   Swallowing is painful 0   The pleasure of eating is affected by my swallowing 0   When I swallow food sticks in my throat 0   I cough when I eat 0   Swallowing is stressful 0   EAT-10 0         Patient Supplied Answers To CSI Questionnaire  No flowsheet data found.      Patient Supplied Answers to Dyspnea Index Questionnaire:  No flowsheet data found.    Impression & Plan     IMPRESSION: Mr. Rasmussen is a 63 year old male who is being seen for the following:    IMPRESSION: Mr. Rasmussen is a 63 year old male who is being seen for the followin. T1 Left vocal fold SCC  -  s/p endoscopic CO2 laser resection 19  - no evidence of recurrence   Plan  - observation  - [id number] evaluations during year 2       2. 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 Dysphagia  - improved swallow today with less pharyngeal residue on evaluation of SLP performed FEES on 20 though continued recommendation of thickened liquids  - screening FEES with liquids shows again aspiration on 7/3/20  - no PNAs, no changes in " weight  - Xray Video Swallow Exam 8/13/20 - safe swallow, much improved  Plan  - observation         RETURN VISIT: office evaluation without SLP 2 months  Lizzy Wray MD    Laryngology    [facility]  Department of  Otolaryngology - Head and Neck Surgery  [facility]  [address] [place] [address]  Appointment line: [phone]  Fax: [phone]

## 2024-09-01 LAB
ALMOND IGE QN: <0.1 KUA/I
CASHEW NUT IGE QN: <0.1 KUA/I
CODFISH IGE QN: <0.1 KUA/I
EGG WHITE IGE QN: <0.1 KUA/I
GLUTEN IGE QN: <0.1 KUA/I
HAZELNUT IGE QN: <0.1 KUA/L
MILK IGE QN: <0.1 KUA/I
PEANUT IGE QN: <0.1 KUA/I
SALMON IGE QN: <0.1 KUA/I
SCALLOP IGE QN: <0.1 KUA/L
SESAME SEED IGE QN: <0.1 KUA/I
SHRIMP IGE QN: <0.1 KUA/L
SOYBEAN IGE QN: <0.1 KUA/I
TOTAL IGE SMQN RAST: 8.43 KU/L (ref 0–113)
TUNA IGE QN: <0.1 KUA/I
WALNUT IGE QN: <0.1 KUA/I
WHEAT IGE QN: <0.1 KUA/I

## 2024-09-05 ENCOUNTER — TELEPHONE (OUTPATIENT)
Age: 74
End: 2024-09-05

## 2024-09-05 ENCOUNTER — OFFICE VISIT (OUTPATIENT)
Dept: FAMILY MEDICINE CLINIC | Facility: CLINIC | Age: 74
End: 2024-09-05
Payer: COMMERCIAL

## 2024-09-05 VITALS
WEIGHT: 246 LBS | HEART RATE: 74 BPM | HEIGHT: 68 IN | SYSTOLIC BLOOD PRESSURE: 124 MMHG | BODY MASS INDEX: 37.28 KG/M2 | TEMPERATURE: 98.2 F | OXYGEN SATURATION: 99 % | DIASTOLIC BLOOD PRESSURE: 84 MMHG | RESPIRATION RATE: 14 BRPM

## 2024-09-05 DIAGNOSIS — Z00.01 ENCOUNTER FOR GENERAL ADULT MEDICAL EXAMINATION WITH ABNORMAL FINDINGS: Primary | ICD-10-CM

## 2024-09-05 DIAGNOSIS — M81.8 IDIOPATHIC OSTEOPOROSIS: ICD-10-CM

## 2024-09-05 DIAGNOSIS — R79.0 LOW FERRITIN: ICD-10-CM

## 2024-09-05 DIAGNOSIS — E55.9 VITAMIN D DEFICIENCY: ICD-10-CM

## 2024-09-05 DIAGNOSIS — E04.1 THYROID NODULE: ICD-10-CM

## 2024-09-05 DIAGNOSIS — I50.32 CHRONIC DIASTOLIC (CONGESTIVE) HEART FAILURE (HCC): ICD-10-CM

## 2024-09-05 DIAGNOSIS — Z12.11 SCREEN FOR COLON CANCER: ICD-10-CM

## 2024-09-05 DIAGNOSIS — F17.210 CIGARETTE SMOKER: ICD-10-CM

## 2024-09-05 LAB
SL AMB  POCT GLUCOSE, UA: 250
SL AMB LEUKOCYTE ESTERASE,UA: ABNORMAL
SL AMB POCT BILIRUBIN,UA: ABNORMAL
SL AMB POCT BLOOD,UA: ABNORMAL
SL AMB POCT CLARITY,UA: CLEAR
SL AMB POCT COLOR,UA: YELLOW
SL AMB POCT KETONES,UA: ABNORMAL
SL AMB POCT NITRITE,UA: ABNORMAL
SL AMB POCT PH,UA: 6
SL AMB POCT SPECIFIC GRAVITY,UA: 1.01
SL AMB POCT URINE PROTEIN: ABNORMAL
SL AMB POCT UROBILINOGEN: 0.2

## 2024-09-05 PROCEDURE — 99214 OFFICE O/P EST MOD 30 MIN: CPT | Performed by: INTERNAL MEDICINE

## 2024-09-05 PROCEDURE — 81002 URINALYSIS NONAUTO W/O SCOPE: CPT | Performed by: INTERNAL MEDICINE

## 2024-09-05 PROCEDURE — G0439 PPPS, SUBSEQ VISIT: HCPCS | Performed by: INTERNAL MEDICINE

## 2024-09-05 NOTE — PROGRESS NOTES
Ambulatory Visit  Name: Hayes Osman      : 1950      MRN: [id number]  Encounter Provider: eKn Caldwell MD  Encounter Date: 2024   Encounter department: [facility]    Assessment & Plan   1. Vitamin D deficiency  2. Thyroid nodule  3. Low ferritin  4. Encounter for general adult medical examination with abnormal findings       Preventive health issues were discussed with patient, and age appropriate screening tests were ordered as noted in patient's After Visit Summary. Personalized health advice and appropriate referrals for health education or preventive services given if needed, as noted in patient's After Visit Summary.    History of Present Illness     HPI   Patient Care Team:  Ken Caldwell MD as PCP - General (Internal Medicine)    Review of Systems  Medical History Reviewed by provider this encounter:       Annual Wellness Visit Questionnaire   Hayes is here for his Subsequent Wellness visit.     Health Risk Assessment:   Patient rates overall health as fair. Patient feels that their physical health rating is same. Patient is satisfied with their life. Eyesight was rated as same. Hearing was rated as same. Patient feels that their emotional and mental health rating is same. Patients states they are never, rarely angry. Patient states they are never, rarely unusually tired/fatigued. Pain experienced in the last 7 days has been none. Patient states that he has experienced no weight loss or gain in last 6 months.     Depression Screening:   PHQ-2 Score: 0      Fall Risk Screening:   In the past year, patient has experienced: no history of falling in past year      Home Safety:  Patient does not have trouble with stairs inside or outside of their home. Patient has no working smoke alarms and has working carbon monoxide detector. Home safety hazards include: none.     Medications:   Patient is not currently taking any over-the-counter supplements. Patient  is able to manage medications.     Activities of Daily Living (ADLs)/Instrumental Activities of Daily Living (IADLs):   Walk and transfer into and out of bed and chair?: Yes  Dress and groom yourself?: Yes    Bathe or shower yourself?: Yes    Feed yourself? Yes  Do your laundry/housekeeping?: Yes  Manage your money, pay your bills and track your expenses?: Yes  Make your own meals?: Yes    Do your own shopping?: Yes    Advance Care Planning:   Living will: No    Durable POA for healthcare: No    Advanced directive counseling given: No    Five wishes given: No      PREVENTIVE SCREENINGS      Cardiovascular Screening:    General: Screening Not Indicated, History Lipid Disorder and Risks and Benefits Discussed      Diabetes Screening:     General: Screening Not Indicated, History Diabetes, Risks and Benefits Discussed and Screening Current      Colorectal Cancer Screening:     General: Screening Current and Risks and Benefits Discussed      Prostate Cancer Screening:    General: Screening Current and Risks and Benefits Discussed      Osteoporosis Screening:    General: Screening Not Indicated, History Osteoporosis and Risks and Benefits Discussed      Abdominal Aortic Aneurysm (AAA) Screening:    Risk factors include: age between 65-74 yo and tobacco use        General: Risks and Benefits Discussed      Lung Cancer Screening:     General: Screening Not Indicated and Risks and Benefits Discussed      Hepatitis C Screening:    General: Screening Current and Risks and Benefits Discussed    Other Counseling Topics:   Car/seat belt/driving safety, skin self-exam, sunscreen and regular weightbearing exercise and calcium and vitamin D intake.     Social Determinants of Health     Financial Resource Strain: Low Risk  (4/23/2024)    Received from [facility]    Overall Financial Resource Strain (CARDIA)     Difficulty of Paying Living Expenses: Not very hard   Food Insecurity: No Food Insecurity (4/23/2024)     "Received from [facility]    Hunger Vital Sign     Worried About Running Out of Food in the Last Year: Never true     Ran Out of Food in the Last Year: Never true   Transportation Needs: No Transportation Needs (4/23/2024)    Received from [facility]    PRAPARE - Transportation     Lack of Transportation (Medical): No     Lack of Transportation (Non-Medical): No   Housing Stability: Low Risk  (4/23/2024)    Received from [facility]    Housing Stability Vital Sign     Unable to Pay for Housing in the Last Year: No     Number of Times Moved in the Last Year: 1     Homeless in the Last Year: No   Utilities: Not At Risk (4/23/2024)    Received from [facility]     Threatened with loss of utilities: No     No results found.    Objective     Ht 5' 8\" (1.727 m)   BMI 38.16 kg/m²     Physical Exam      "

## 2024-09-05 NOTE — PROGRESS NOTES
Ambulatory Visit  Name: Hayes Osman      : 1950      MRN: [id number]  Encounter Provider: Ken Caldwell MD  Encounter Date: 2024   Encounter department: [facility]    Assessment & Plan   1. Encounter for general adult medical examination with abnormal findings  Comments:  Done in detail  life style mod  RTC in 3 mos w Blood work  2. Vitamin D deficiency  3. Thyroid nodule  Comments:  Pt needs Bx  4. Low ferritin  Comments:  ?Cause ?? GI and Hematology Consults ASAP  Orders:  -     POCT urine dip  5. Idiopathic osteoporosis  -     DXA bone density spine hip and pelvis; Future; Expected date: 2024  -     CT lung screening program; Future; Expected date: 2024  -     POCT urine dip  6. Screen for colon cancer  -     POCT urine dip  7. Cigarette smoker  Comments:  H/O Yearly Ct Lungs  Orders:  -     CT lung screening program; Future; Expected date: 2024  8. Chronic diastolic (congestive) heart failure (HCC)  Comments:  stable  continue same  FU w cardiology  AWV : Done in detail  Life style Mod  RTC in 3 mos w  Blood work       History of Present Illness     73 Y O man is here for AWV and Regular Check up, he feels OK< recent Blood work and med list reviewed w Pt,...        Review of Systems   Constitutional:  Negative for chills, fatigue and fever.   HENT:  Negative for congestion, facial swelling, sore throat, trouble swallowing and voice change.    Eyes:  Negative for pain, discharge and visual disturbance.   Respiratory:  Negative for cough, shortness of breath and wheezing.    Cardiovascular:  Negative for chest pain, palpitations and leg swelling.   Gastrointestinal:  Negative for abdominal pain, blood in stool, constipation, diarrhea and nausea.   Endocrine: Negative for polydipsia, polyphagia and polyuria.   Genitourinary:  Negative for difficulty urinating, hematuria and urgency.   Musculoskeletal:  Negative for arthralgias and myalgias.  "  Skin:  Negative for rash.   Neurological:  Negative for dizziness, tremors, weakness and headaches.   Hematological:  Negative for adenopathy. Does not bruise/bleed easily.   Psychiatric/Behavioral:  Negative for dysphoric mood, sleep disturbance and suicidal ideas.        Objective     /84 (BP Location: Left arm, Patient Position: Sitting, Cuff Size: Standard)   Pulse 74   Temp 98.2 °F (36.8 °C) (Tympanic)   Resp 14   Ht 5' 8\" (1.727 m)   Wt 112 kg (246 lb)   SpO2 99%   BMI 37.40 kg/m²     Physical Exam  Vitals and nursing note reviewed.   Constitutional:       General: He is not in acute distress.     Appearance: He is well-developed.   HENT:      Head: Normocephalic and atraumatic.      Right Ear: External ear normal.      Left Ear: External ear normal.   Eyes:      Extraocular Movements: EOM normal.      Conjunctiva/sclera: Conjunctivae normal.      Pupils: Pupils are equal, round, and reactive to light.   Neck:      Thyroid: No thyromegaly.      Trachea: No tracheal deviation.   Cardiovascular:      Rate and Rhythm: Normal rate and regular rhythm.      Heart sounds: Murmur heard.      No friction rub.   Pulmonary:      Effort: Pulmonary effort is normal. No respiratory distress.      Breath sounds: Normal breath sounds. No wheezing.   Abdominal:      General: Bowel sounds are normal. There is no distension.      Palpations: Abdomen is soft.      Tenderness: There is no abdominal tenderness.   Musculoskeletal:         General: No swelling, deformity or edema. Normal range of motion.      Cervical back: Neck supple.   Skin:     General: Skin is warm and dry.      Capillary Refill: Capillary refill takes less than 2 seconds.      Findings: No erythema or rash.   Neurological:      Mental Status: He is alert and oriented to person, place, and time.      Cranial Nerves: No cranial nerve deficit.      Coordination: Coordination normal.      Deep Tendon Reflexes: Reflexes normal.   Psychiatric:         " Mood and Affect: Mood and affect and mood normal.         Behavior: Behavior normal.       Administrative Statements

## 2024-09-05 NOTE — PATIENT INSTRUCTIONS
Medicare Preventive Visit Patient Instructions  Thank you for completing your Welcome to Medicare Visit or Medicare Annual Wellness Visit today. Your next wellness visit will be due in one year (9/6/2025).  The screening/preventive services that you may require over the next 5-10 years are detailed below. Some tests may not apply to you based off risk factors and/or age. Screening tests ordered at today's visit but not completed yet may show as past due. Also, please note that scanned in results may not display below.  Preventive Screenings:  Service Recommendations Previous Testing/Comments   Colorectal Cancer Screening  Colonoscopy    Fecal Occult Blood Test (FOBT)/Fecal Immunochemical Test (FIT)  Fecal DNA/Cologuard Test  Flexible Sigmoidoscopy Age: 45-75 years old   Colonoscopy: every 10 years (May be performed more frequently if at higher risk)  OR  FOBT/FIT: every 1 year  OR  Cologuard: every 3 years  OR  Sigmoidoscopy: every 5 years  Screening may be recommended earlier than age 45 if at higher risk for colorectal cancer. Also, an individualized decision between you and your healthcare provider will decide whether screening between the ages of 76-85 would be appropriate. Colonoscopy: 10/13/2017  FOBT/FIT: Not on file  Cologuard: 06/19/2024  Sigmoidoscopy: Not on file    Screening Current     Prostate Cancer Screening Individualized decision between patient and health care provider in men between ages of 55-69   Medicare will cover every 12 months beginning on the day after your 50th birthday PSA: 0.655 ng/mL     Screening Current     Hepatitis C Screening Once for adults born between 1945 and 1965  More frequently in patients at high risk for Hepatitis C Hep C Antibody: 08/29/2024    Screening Current   Diabetes Screening 1-2 times per year if you're at risk for diabetes or have pre-diabetes Fasting glucose: 85 mg/dL (8/29/2024)  A1C: 7.2 (7/15/2024)  Screening Not Indicated  History Diabetes   Cholesterol  Screening Once every 5 years if you don't have a lipid disorder. May order more often based on risk factors. Lipid panel: 08/29/2024  Screening Not Indicated  History Lipid Disorder      Other Preventive Screenings Covered by Medicare:  Abdominal Aortic Aneurysm (AAA) Screening: covered once if your at risk. You're considered to be at risk if you have a family history of AAA or a male between the age of 65-75 who smoking at least 100 cigarettes in your lifetime.  Lung Cancer Screening: covers low dose CT scan once per year if you meet all of the following conditions: (1) Age 55-77; (2) No signs or symptoms of lung cancer; (3) Current smoker or have quit smoking within the last 15 years; (4) You have a tobacco smoking history of at least 20 pack years (packs per day x number of years you smoked); (5) You get a written order from a healthcare provider.  Glaucoma Screening: covered annually if you're considered high risk: (1) You have diabetes OR (2) Family history of glaucoma OR (3)  aged 50 and older OR (4)  American aged 65 and older  Osteoporosis Screening: covered every 2 years if you meet one of the following conditions: (1) Have a vertebral abnormality; (2) On glucocorticoid therapy for more than 3 months; (3) Have primary hyperparathyroidism; (4) On osteoporosis medications and need to assess response to drug therapy.  HIV Screening: covered annually if you're between the age of 15-65. Also covered annually if you are younger than 15 and older than 65 with risk factors for HIV infection. For pregnant patients, it is covered up to 3 times per pregnancy.    Immunizations:  Immunization Recommendations   Influenza Vaccine Annual influenza vaccination during flu season is recommended for all persons aged >= 6 months who do not have contraindications   Pneumococcal Vaccine   * Pneumococcal conjugate vaccine = PCV13 (Prevnar 13), PCV15 (Vaxneuvance), PCV20 (Prevnar 20)  * Pneumococcal  polysaccharide vaccine = PPSV23 (Pneumovax) Adults 19-63 yo with certain risk factors or if 65+ yo  If never received any pneumonia vaccine: recommend Prevnar 20 (PCV20)  Give PCV20 if previously received 1 dose of PCV13 or PPSV23   Hepatitis B Vaccine 3 dose series if at intermediate or high risk (ex: diabetes, end stage renal disease, liver disease)   Respiratory syncytial virus (RSV) Vaccine - COVERED BY MEDICARE PART D  * RSVPreF3 (Arexvy) CDC recommends that adults 60 years of age and older may receive a single dose of RSV vaccine using shared clinical decision-making (SCDM)   Tetanus (Td) Vaccine - COST NOT COVERED BY MEDICARE PART B Following completion of primary series, a booster dose should be given every 10 years to maintain immunity against tetanus. Td may also be given as tetanus wound prophylaxis.   Tdap Vaccine - COST NOT COVERED BY MEDICARE PART B Recommended at least once for all adults. For pregnant patients, recommended with each pregnancy.   Shingles Vaccine (Shingrix) - COST NOT COVERED BY MEDICARE PART B  2 shot series recommended in those 19 years and older who have or will have weakened immune systems or those 50 years and older     Health Maintenance Due:      Topic Date Due   • Colorectal Cancer Screening  06/20/2024   • Hepatitis C Screening  Completed     Immunizations Due:      Topic Date Due   • COVID-19 Vaccine (4 - 2023-24 season) 09/01/2024   • Influenza Vaccine (1) 09/01/2024     Advance Directives   What are advance directives?  Advance directives are legal documents that state your wishes and plans for medical care. These plans are made ahead of time in case you lose your ability to make decisions for yourself. Advance directives can apply to any medical decision, such as the treatments you want, and if you want to donate organs.   What are the types of advance directives?  There are many types of advance directives, and each state has rules about how to use them. You may choose a  combination of any of the following:  Living will:  This is a written record of the treatment you want. You can also choose which treatments you do not want, which to limit, and which to stop at a certain time. This includes surgery, medicine, IV fluid, and tube feedings.   Durable power of  for healthcare (DPAHC):  This is a written record that states who you want to make healthcare choices for you when you are unable to make them for yourself. This person, called a proxy, is usually a family member or a friend. You may choose more than 1 proxy.  Do not resuscitate (DNR) order:  A DNR order is used in case your heart stops beating or you stop breathing. It is a request not to have certain forms of treatment, such as CPR. A DNR order may be included in other types of advance directives.  Medical directive:  This covers the care that you want if you are in a coma, near death, or unable to make decisions for yourself. You can list the treatments you want for each condition. Treatment may include pain medicine, surgery, blood transfusions, dialysis, IV or tube feedings, and a ventilator (breathing machine).  Values history:  This document has questions about your views, beliefs, and how you feel and think about life. This information can help others choose the care that you would choose.  Why are advance directives important?  An advance directive helps you control your care. Although spoken wishes may be used, it is better to have your wishes written down. Spoken wishes can be misunderstood, or not followed. Treatments may be given even if you do not want them. An advance directive may make it easier for your family to make difficult choices about your care.   Weight Management   Why it is important to manage your weight:  Being overweight increases your risk of health conditions such as heart disease, high blood pressure, type 2 diabetes, and certain types of cancer. It can also increase your risk for  osteoarthritis, sleep apnea, and other respiratory problems. Aim for a slow, steady weight loss. Even a small amount of weight loss can lower your risk of health problems.  How to lose weight safely:  A safe and healthy way to lose weight is to eat fewer calories and get regular exercise. You can lose up about 1 pound a week by decreasing the number of calories you eat by 500 calories each day.   Healthy meal plan for weight management:  A healthy meal plan includes a variety of foods, contains fewer calories, and helps you stay healthy. A healthy meal plan includes the following:  Eat whole-grain foods more often.  A healthy meal plan should contain fiber. Fiber is the part of grains, fruits, and vegetables that is not broken down by your body. Whole-grain foods are healthy and provide extra fiber in your diet. Some examples of whole-grain foods are whole-wheat breads and pastas, oatmeal, brown rice, and bulgur.  Eat a variety of vegetables every day.  Include dark, leafy greens such as spinach, kale, dmitriy greens, and mustard greens. Eat yellow and orange vegetables such as carrots, sweet potatoes, and winter squash.   Eat a variety of fruits every day.  Choose fresh or canned fruit (canned in its own juice or light syrup) instead of juice. Fruit juice has very little or no fiber.  Eat low-fat dairy foods.  Drink fat-free (skim) milk or 1% milk. Eat fat-free yogurt and low-fat cottage cheese. Try low-fat cheeses such as mozzarella and other reduced-fat cheeses.  Choose meat and other protein foods that are low in fat.  Choose beans or other legumes such as split peas or lentils. Choose fish, skinless poultry (chicken or turkey), or lean cuts of red meat (beef or pork). Before you cook meat or poultry, cut off any visible fat.   Use less fat and oil.  Try baking foods instead of frying them. Add less fat, such as margarine, sour cream, regular salad dressing and mayonnaise to foods. Eat fewer high-fat foods. Some  examples of high-fat foods include french fries, doughnuts, ice cream, and cakes.  Eat fewer sweets.  Limit foods and drinks that are high in sugar. This includes candy, cookies, regular soda, and sweetened drinks.  Exercise:  Exercise at least 30 minutes per day on most days of the week. Some examples of exercise include walking, biking, dancing, and swimming. You can also fit in more physical activity by taking the stairs instead of the elevator or parking farther away from stores. Ask your healthcare provider about the best exercise plan for you.    © Copyright [employer] 2018 Information is for End User's use only and may not be sold, redistributed or otherwise used for commercial purposes. All illustrations and images included in CareNotes® are the copyrighted property of [employer] Inc[employer] or [employer]    Osteoporosis Education   Osteoporosis  is a long-term medical condition that causes your bones to become weak, brittle, and more likely to fracture. Osteoporosis occurs when your body absorbs more bone than it makes. It is also caused by a lack of calcium and estrogen (female hormone).  Common symptoms include the following:  You may not have any signs or symptoms. You may break a bone after a muscle strain, bump, or fall. A break usually occurs in the hip, spine, or wrist. A collapsed vertebra (bone in your spine) may cause severe back pain or loss of height from bent posture.  Call your doctor if:    You have severe pain.   You have increasing pain after a fall.   You have pain when you do your daily activities.   You have questions or concerns about your condition or care.  Diagnosis of osteoporosis:   Blood and urine tests  measure your calcium, vitamin D, and estrogen levels.    An x-ray or CT may show thinned bones or a fracture. You may be given contrast liquid to help the bones show up better in the pictures. Tell the healthcare provider if you have ever had an allergic reaction to contrast  liquid. Do not enter the MRI room with anything metal. Metal can cause serious injury. Tell the healthcare provider if you have any metal in or on your body.    A bone density test  compares your bone thickness with what is expected for someone of your age, gender, and ethnicity.  Treatment for osteoporosis may include medicines to prevent bone loss, build new bone, and increase estrogen. These medicines help prevent fractures and may be given as a pill or injection. Ask your healthcare provider for more information on these medicines.  Prevent bone loss:  Eat healthy foods that are high in calcium.  This helps keep your bones strong. Good sources of calcium are milk, cheese, broccoli, tofu, almonds, and canned salmon and sardines. Recommended to get at least 1200mg daily of calcium.  Increase your vitamin D intake.  Vitamin D is in fish oils, some vegetables, and fortified milk, cereal, and bread. Vitamin D is also formed in the skin when it is exposed to the sun. Ask your healthcare provider how much sunlight is safe for you. You will require at least 800 units of vitamin D daily taken as a supplement.  Drink liquids as directed.  Ask your healthcare provider how much liquid to drink each day and which liquids are best for you. Do not have alcohol or caffeine. They decrease bone mineral density, which can weaken your bones.  Exercise regularly.  Ask your healthcare provider about the best exercise plan for you. Weight bearing exercise for 30 minutes, 3 times a week can help build and strengthen bone.  Do not smoke.  Nicotine and other chemicals in cigarettes and cigars can cause lung damage. Ask your healthcare provider for information if you currently smoke and need help to quit. E-cigarettes or smokeless tobacco still contain nicotine. Talk to your healthcare provider before you use these products.  Go to physical therapy as directed.  A physical therapist teaches you exercises to help improve movement and  "muscle strength.  Alcohol. It is recommended to avoid heavy alcohol use as increased consumption of alcohol is known to cause bone loss  © Copyright [employer] 2021 Information is for End User's use only and may not be sold, redistributed or otherwise used for commercial purposes. All illustrations and images included in CareNotes® are the copyrighted property of [employer] or [employer]    Calcium and vitamin D for bone health (Beyond the Basics)    CALCIUM AND VITAMIN D OVERVIEW  Osteoporosis is a common bone disorder that causes a progressive loss in bone density and mass. As a result, bones become thin, weakened, and easily fractured. It is estimated that more than 1.3 million osteoporosis-associated (or \"osteoporotic\") fractures occur every year in the [place], primarily of bone within the spine (the vertebrae), the hip, and the forearm near the wrist. =    A number of treatments can help to prevent loss of bone and treat low bone mass. However, the first step in preventing or treating osteoporosis is to consume foods and drinks that provide calcium, a mineral essential for bone strength, and vitamin D, which aids in calcium breakdown and absorption. =    CALCIUM AND VITAMIN D BENEFITS  Good nutrition is important at all ages to keep the bones healthy.    Taking calcium reduces bone loss and decreases the risk of fracturing the vertebrae (the bones that surround the spinal cord).  Consuming calcium during childhood (eg, in milk) can lead to higher bone mass in adulthood. This increase in bone density can reduce the risk of fractures later in life.  Calcium may also have benefits in other body systems by reducing blood pressure and cholesterol levels.  Calcium and vitamin D supplements may help prevent tooth loss in older adults.    RECOMMENDATIONS FOR CALCIUM    General recommendations -- Premenopausal women and men should consume at least 1000 mg of calcium, while postmenopausal women " should consume 1200 mg (total diet plus supplement). You should not consume more than 2000 mg of calcium per day (total diet plus supplement) due to the risk of side effects.    Calcium in the diet -- The primary sources of calcium in the diet include milk and other dairy products, such as hard cheese, cottage cheese, or yogurt, as well as green vegetables, such as kale and broccoli (table 1). Some cereals, soy products, and fruit juices are fortified with calcium.    Calcium supplements -- The body is able to absorb calcium contained in supplements as well as from dietary sources. If it is not possible to get enough calcium from dietary sources, consult a health care provider to determine the best type, dose, and timing of calcium supplements.     Calcium carbonate is effective and is the least expensive form of calcium. It is best absorbed with a low-iron meal (such as breakfast). Calcium carbonate may not be absorbed well in people who also take a specific medication for gastroesophageal reflux (called a proton pump inhibitor or H2 blocker), which blocks stomach acid.  Many natural calcium carbonate preparations such as oyster shells or bone meal contain some lead.  Calcium citrate is well absorbed in the fasting state as well as with a meal.  Calcium doses above 500 mg are not absorbed as well as smaller doses, so large doses of supplements should be taken in divided doses (eg, in the morning and evening).  Calcium supplements do not replace other osteoporosis treatments such as hormone replacement, bisphosphonates (eg, risedronate [sample brand name: Actonel] and alendronate [brand name: Fosamax]), and raloxifene (brand name: Evista).    Calcium and vitamin D supplements alone are usually insufficient to prevent age-related bone loss, although they may be beneficial in some subgroups (older adults, those with very low intake). In most patients with or at risk for osteoporosis, the addition of medication or  hormonal therapy is necessary in order to slow the breakdown and removal of bone (ie, resorption).     Underlying gastrointestinal diseases -- Patients who do not adequately absorb nutrients from the gastrointestinal tract (due to malabsorption) may require more than 1000 to 1200 mg of calcium per day. In such cases, a health care provider can help to determine the optimal dose of calcium.    Medications -- All medications should be discussed with a health care provider to ensure that possible interactions with calcium are identified. Certain medications change the amount of calcium that is absorbed and/or excreted. As an example, loop diuretics (eg, furosemide [sample brand name: Lasix]) increase the amount of calcium excreted in the urine.    On the other hand, thiazide diuretics (eg, hydrochlorothiazide) can reduce levels of calcium in the urine, potentially reducing the risk of bone loss and kidney stones.    Side effects of calcium -- Calcium is usually easily tolerated when it is taken in divided doses several times per day. Some people experience side effects related to calcium, including constipation and indigestion. Calcium supplements interfere with the absorption of iron and thyroid hormone, and therefore, these medications should be taken at different times.    Kidney stones -- There is no evidence that consuming large amounts of calcium (from foods and drinks) increases the risk of kidney stones, or that avoiding dietary calcium decreases the risk. In fact, avoiding dairy products is likely to increase the risk of kidney stones.    However, use of calcium supplements may increase the risk of kidney stones in susceptible individuals by raising the level of calcium in the urine. This is particularly true if the supplement is taken between meals or at bedtime, and this is one of the reasons we prefer for patients to get as much of their calcium requirement through dietary means.     IMPORTANCE OF VITAMIN  D  Vitamin D decreases bone loss and lowers the risk of fracture, especially in older men and women. Along with calcium, vitamin D also helps to prevent and treat osteoporosis. To absorb calcium efficiently, an adequate amount of vitamin D must be present.    Vitamin D is normally made in the skin after exposure to sunlight.    Recommendations for vitamin D -- The current recommendation is that men over 70 years and postmenopausal women consume at least 800 international units (20 micrograms) of vitamin D per day. Lower levels of vitamin D are not as effective, while high doses can be toxic, especially if taken for long periods of time. Although the optimal intake has not been clearly established in premenopausal women or in younger men with osteoporosis, 600 international units (15 micrograms) of vitamin D daily is generally suggested.    Vitamin D is available as an individual supplement and is included in most multivitamins and some calcium supplements (table 2). Milk is a relatively good dietary source of vitamin D, with approximately 100 international units (2.5 micrograms) per cup (240 mL), and salmon has 800 to 1000 international units (20 to 25 micrograms) of vitamin D per serving.    Most healthy people don't need to check with their health care provider before taking standard doses of vitamin D and don't need monitoring of vitamin D levels if they are not being treated for vitamin D deficiency. However, recommendations for vitamin D supplementation may be different in people with certain underlying medical conditions, such as sarcoidosis or lymphoma. In these situations, a provider will determine if supplements are needed; if so, the person's vitamin D and calcium levels should be monitored with regular tests.    ©2021 [employer] All rights reserved.

## 2024-09-05 NOTE — TELEPHONE ENCOUNTER
I called [facility] in response to a referral that was received for patient to establish care with Hematology    Outreach was made to schedule a consultation.    A consultation was scheduled for patient during this call. Patient is scheduled on 9/6/24 at 1:40pm with Mami Yu at the [facility]       Patient called in stating that he does not use [facility] insurance and does not need an authorization to be seen with Hematology.  Patient requested to be seen as soon as possible and stated he would travel to be seen sooner.  A message will be send to the [employer] pool in regards to patient's appointment.  Patient states he uses the [employer] Medicare insurance for his medical care.

## 2024-09-05 NOTE — TELEPHONE ENCOUNTER
Patient reached out to  schedule for Heme/Onc but we are still waiting for authorization from [employer].  Patient stated that he no longer uses [employer].  Only [employer].   Outreach was made to referring provider to obtain [employer] auth.  Will follow up.

## 2024-09-06 ENCOUNTER — OFFICE VISIT (OUTPATIENT)
Dept: HEMATOLOGY ONCOLOGY | Facility: CLINIC | Age: 74
End: 2024-09-06
Payer: COMMERCIAL

## 2024-09-06 VITALS
SYSTOLIC BLOOD PRESSURE: 138 MMHG | DIASTOLIC BLOOD PRESSURE: 66 MMHG | HEART RATE: 83 BPM | TEMPERATURE: 98.2 F | HEIGHT: 68 IN | BODY MASS INDEX: 37.89 KG/M2 | WEIGHT: 250 LBS | OXYGEN SATURATION: 94 %

## 2024-09-06 DIAGNOSIS — R79.0 LOW FERRITIN: ICD-10-CM

## 2024-09-06 PROCEDURE — 99204 OFFICE O/P NEW MOD 45 MIN: CPT | Performed by: NURSE PRACTITIONER

## 2024-09-06 NOTE — PATIENT INSTRUCTIONS
Take pantoprazole at a separate time than the iron tablet. The iron is better absorbed with acid and pantoprazole decreases the acid in the bowel.

## 2024-09-06 NOTE — PROGRESS NOTES
HEM ONC [facility] [place]  [address] [facility] [id number] [phone]  Hematology Ambulatory Consult  Hayes Osman, 1950, [id number]  9/6/2024      Assessment and Plan   1. Low ferritin  Patient is a 73-year-old male with a history of type 2 diabetes, chronic low back pain, hyperlipidemia, hypertension, myocardial infarction, and sleep apnea who was referred for further evaluation of low ferritin.  Labs from 8/29/2024 show a normal CBC and differential, ferritin level 12, CMP within normal limits, creatinine 1.07, calcium 9.6, protein 8.1, EGFR 69.  Vitamin B12 level 485.  No iron saturation was obtained.  Patient was started on oral iron approximately 2 weeks ago.  [facility]  He denies blood in his stool.  We discussed etiology of iron deficiency including nutritional versus GI bleed.  Patient reports eating iron rich foods including meat.  He is tolerating oral iron without difficulty.  Overall he is able to function without restriction.  He is on pantoprazole I advised him to take it at a separate time from the iron supplement as it is better absorbed with acid.  We briefly discussed iron infusions indications and adverse reactions.  Given that patient is tolerating oral iron he would prefer to continue.  We will repeat labs in 3 months.    - Ambulatory Referral to Hematology / Oncology    Patient verbalized understanding and is in agreement to the plan. Patient knows to call the Hematology/Oncology office with any questions and concerns regarding the above.    Barrier(s) to care: None.   The patient is able to self care.    Mami MEYER  Medical Oncology/Hematology  [facility]    Subjective   No chief complaint on file.      Referring provider    Ken Caldwell MD  [facility] [address] [place] [id number]    History of present illness:   Patient is a 73-year-old male with a  history of type 2 diabetes, chronic low back pain, hyperlipidemia, hypertension, myocardial infarction, and sleep apnea who was referred for further evaluation of low ferritin.    09/06/24: Clinically stable    Review of Systems   Constitutional:  Negative for activity change, appetite change, fatigue, fever and unexpected weight change.   Respiratory:  Negative for cough and shortness of breath.    Cardiovascular:  Negative for chest pain and leg swelling.   Gastrointestinal:  Negative for abdominal pain, constipation, diarrhea and nausea.   Endocrine: Negative for cold intolerance and heat intolerance.   Musculoskeletal:  Negative for arthralgias and myalgias.   Skin: Negative.    Neurological:  Negative for dizziness, weakness and headaches.   Hematological:  Negative for adenopathy. Does not bruise/bleed easily.     Past Medical History:   Diagnosis Date    Colon polyps     Diabetes mellitus (HCC)     Hyperlipidemia     Hypertension     Myocardial infarction (HCC)     Sleep apnea      Past Surgical History:   Procedure Laterality Date    COLONOSCOPY      DC ESOPHAGOGASTRODUODENOSCOPY TRANSORAL DIAGNOSTIC N/A 10/13/2017    Procedure: EGD AND COLONOSCOPY;  Surgeon: Orlin Farmer MD;  Location: [facility];  Service: Gastroenterology     No family history on file.  Social History     Socioeconomic History    Marital status: /Civil Union     Spouse name: None    Number of children: None    Years of education: None    Highest education level: None   Occupational History    None   Tobacco Use    Smoking status: Former    Smokeless tobacco: Never   Vaping Use    Vaping status: Never Used   Substance and Sexual Activity    Alcohol use: No    Drug use: No    Sexual activity: Not Currently   Other Topics Concern    None   Social History Narrative    None     Social Determinants of Health     Financial Resource Strain: Low Risk  (4/23/2024)    Received from [facility]    Overall Financial Resource  Strain (CARDIA)     Difficulty of Paying Living Expenses: Not very hard   Food Insecurity: No Food Insecurity (9/5/2024)    Hunger Vital Sign     Worried About Running Out of Food in the Last Year: Never true     Ran Out of Food in the Last Year: Never true   Transportation Needs: No Transportation Needs (9/5/2024)    PRAPARE - Transportation     Lack of Transportation (Medical): No     Lack of Transportation (Non-Medical): No   Physical Activity: Not on file   Stress: No Stress Concern Present (4/23/2024)    Received from [facility]    [employer] [place] [employer] - Occupational Stress Questionnaire     Feeling of Stress : Not at all   Social Connections: Feeling Socially Integrated (4/23/2024)    Received from [facility]    OASIS : Social Isolation     How often do you feel lonely or isolated from those around you?: Never   Intimate Partner Violence: Not At Risk (4/23/2024)    Received from [facility]    Humiliation, Afraid, Rape, and Kick questionnaire     Fear of Current or Ex-Partner: No     Emotionally Abused: No     Physically Abused: No     Sexually Abused: No   Housing Stability: Low Risk  (9/5/2024)    Housing Stability Vital Sign     Unable to Pay for Housing in the Last Year: No     Number of Times Moved in the Last Year: 0     Homeless in the Last Year: No     Current Outpatient Medications:     acetaminophen (TYLENOL) 650 mg CR tablet, Take 1 tablet (650 mg total) by mouth every 8 (eight) hours as needed for moderate pain, Disp: 30 tablet, Rfl: 0    bacitracin-polymyxin b (POLYSPORIN) ophthalmic ointment, , Disp: , Rfl:     budesonide-formoterol (Symbicort) 80-4.5 MCG/ACT inhaler, Inhale 2 puffs 2 (two) times a day, Disp: 10.2 g, Rfl: 3    camphor-menthol (SARNA) lotion, Apply topically as needed for itching, Disp: , Rfl:     carvedilol (COREG) 12.5 mg tablet, Take 1 tablet (12.5 mg total) by mouth 2 (two) times a day with meals,  Disp: 200 tablet, Rfl: 3    cyanocobalamin (VITAMIN B-12) 1000 MCG tablet, Take 1 tablet (1,000 mcg total) by mouth daily, Disp: 100 tablet, Rfl: 3    eplerenone (INSPRA) 50 MG tablet, Take 50 mg by mouth daily, Disp: , Rfl:     ergocalciferol (VITAMIN D2) 50,000 units, Take 1 capsule (50,000 Units total) by mouth once a week, Disp: 12 capsule, Rfl: 2    gabapentin (NEURONTIN) 100 mg capsule, Take 1 capsule (100 mg total) by mouth daily at bedtime, Disp: 30 capsule, Rfl: 0    insulin lispro (HumaLOG) 100 units/mL injection, Inject under the skin 2 (two) times a day, Disp: , Rfl:     ketoconazole (NIZORAL) 2 % shampoo, Apply topically once, Disp: , Rfl:     latanoprost (XALATAN) 0.005 % ophthalmic solution, 1 drop daily at bedtime, Disp: , Rfl:     lidocaine (LMX) 4 % cream, Apply topically as needed for mild pain, Disp: 30 g, Rfl: 0    loratadine (CLARITIN) 10 mg tablet, Take 10 mg by mouth daily, Disp: , Rfl:     magnesium oxide-pyridoxine (BEELITH) 362-20 MG TABS, Take 1 tablet by mouth daily, Disp: , Rfl:     metFORMIN (GLUCOPHAGE) 1000 MG tablet, Take 1 tablet (1,000 mg total) by mouth 2 (two) times a day with meals, Disp: 200 tablet, Rfl: 3    NIFEdipine ER (ADALAT CC) 60 MG 24 hr tablet, Take 1 tablet (60 mg total) by mouth daily, Disp: 100 tablet, Rfl: 3    olopatadine (PATANOL) 0.1 % ophthalmic solution, 1 drop 2 (two) times a day, Disp: , Rfl:     pantoprazole (PROTONIX) 40 mg tablet, Take 40 mg by mouth daily, Disp: , Rfl:     rosuvastatin (CRESTOR) 5 mg tablet, Take 1 tablet (5 mg total) by mouth daily, Disp: 100 tablet, Rfl: 3    selenium sulfide (SELSUN) 2.5 % shampoo, Apply topically daily as needed for dandruff, Disp: , Rfl:     semaglutide, 2 mg/dose, (Ozempic) 8 mg/ mL injection pen, Inject 0.75 mL (2 mg total) under the skin every 7 days, Disp: 9 mL, Rfl: 1    sildenafil (VIAGRA) 100 mg tablet, Take 100 mg by mouth daily as needed for erectile dysfunction, Disp: , Rfl:     Silodosin 8 MG CAPS, Take  "1 capsule by mouth daily at bedtime, Disp: 100 capsule, Rfl: 3    torsemide (DEMADEX) 10 mg tablet, Take 1 tablet (10 mg total) by mouth daily, Disp: 100 tablet, Rfl: 3    triamcinolone (KENALOG) 0.1 % cream, Apply topically 2 (two) times a day, Disp: , Rfl:     valsartan (DIOVAN) 80 mg tablet, Take 1 tablet (80 mg total) by mouth daily, Disp: 100 tablet, Rfl: 3  Allergies   Allergen Reactions    Penicillins      Objective   /66 (BP Location: Left arm, Patient Position: Sitting, Cuff Size: Standard)   Pulse 83   Temp 98.2 °F (36.8 °C) (Temporal)   Ht 5' 8\" (1.727 m)   Wt 113 kg (250 lb)   SpO2 94%   BMI 38.01 kg/m²   Physical Exam  Vitals reviewed.   Constitutional:       Appearance: Normal appearance. He is well-developed.   HENT:      Head: Normocephalic and atraumatic.   Eyes:      Pupils: Pupils are equal, round, and reactive to light.   Pulmonary:      Effort: Pulmonary effort is normal. No respiratory distress.   Musculoskeletal:         General: Normal range of motion.      Cervical back: Normal range of motion.   Lymphadenopathy:      Cervical: No cervical adenopathy.   Skin:     General: Skin is dry.   Neurological:      Mental Status: He is alert and oriented to person, place, and time.   Psychiatric:         Behavior: Behavior normal.     Result Review  Labs:  Lab Results   Component Value Date    WBC 6.85 08/29/2024    HGB 13.3 08/29/2024    HCT 41.1 08/29/2024    MCV 93 08/29/2024     08/29/2024     Lab Results   Component Value Date    SODIUM 139 08/29/2024    K 3.5 08/29/2024     08/29/2024    CO2 28 08/29/2024    AGAP 11 08/29/2024    BUN 20 08/29/2024    CREATININE 1.07 08/29/2024    GLUC 91 06/06/2024    GLUF 85 08/29/2024    CALCIUM 9.6 08/29/2024    AST 21 08/29/2024    ALT 25 08/29/2024    ALKPHOS 94 08/29/2024    TP 8.1 08/29/2024    TBILI 0.43 08/29/2024    EGFR 69 08/29/2024     Please note:  This report has been generated by a voice recognition software system. " Therefore there may be syntax, spelling, and/or grammatical errors. Please call if you have any questions.

## 2024-09-27 ENCOUNTER — HOSPITAL ENCOUNTER (OUTPATIENT)
Dept: NON INVASIVE DIAGNOSTICS | Facility: HOSPITAL | Age: 74
Discharge: HOME | End: 2024-09-27
Payer: COMMERCIAL

## 2024-09-27 VITALS
SYSTOLIC BLOOD PRESSURE: 138 MMHG | WEIGHT: 250 LBS | DIASTOLIC BLOOD PRESSURE: 66 MMHG | HEART RATE: 70 BPM | BODY MASS INDEX: 37.89 KG/M2 | HEIGHT: 68 IN

## 2024-09-27 DIAGNOSIS — R60.9 EDEMA, UNSPECIFIED: ICD-10-CM

## 2024-09-27 PROCEDURE — 93306 TTE W/DOPPLER COMPLETE: CPT | Performed by: INTERNAL MEDICINE

## 2024-09-27 PROCEDURE — 93306 TTE W/DOPPLER COMPLETE: CPT

## 2024-09-29 LAB
AORTIC ROOT: 3.3 CM
APICAL FOUR CHAMBER EJECTION FRACTION: 59 %
BSA FOR ECHO PROCEDURE: 2.25 M2
E WAVE DECELERATION TIME: 281 MS
E/A RATIO: 1.01
FRACTIONAL SHORTENING: 29 (ref 28–44)
INTERVENTRICULAR SEPTUM IN DIASTOLE (PARASTERNAL SHORT AXIS VIEW): 1.4 CM
INTERVENTRICULAR SEPTUM: 1.4 CM (ref 0.6–1.1)
LAAS-AP2: 17.1 CM2
LAAS-AP4: 15.7 CM2
LEFT ATRIUM SIZE: 4.3 CM
LEFT ATRIUM VOLUME (MOD BIPLANE): 47 ML
LEFT ATRIUM VOLUME INDEX (MOD BIPLANE): 20.9 ML/M2
LEFT INTERNAL DIMENSION IN SYSTOLE: 3.6 CM (ref 2.1–4)
LEFT VENTRICULAR INTERNAL DIMENSION IN DIASTOLE: 5.1 CM (ref 3.5–6)
LEFT VENTRICULAR POSTERIOR WALL IN END DIASTOLE: 1.5 CM
LEFT VENTRICULAR STROKE VOLUME: 68 ML
LVSV (TEICH): 68 ML
MV E'TISSUE VEL-SEP: 4 CM/S
MV PEAK A VEL: 0.9 M/S
MV PEAK E VEL: 91 CM/S
MV STENOSIS PRESSURE HALF TIME: 82 MS
MV VALVE AREA P 1/2 METHOD: 2.68
RIGHT ATRIUM AREA SYSTOLE A4C: 13.7 CM2
RIGHT VENTRICLE ID DIMENSION: 3.8 CM
SL CV LEFT ATRIUM LENGTH A2C: 5.2 CM
SL CV PED ECHO LEFT VENTRICLE DIASTOLIC VOLUME (MOD BIPLANE) 2D: 124 ML
SL CV PED ECHO LEFT VENTRICLE SYSTOLIC VOLUME (MOD BIPLANE) 2D: 56 ML
TR MAX PG: 18 MMHG
TR PEAK VELOCITY: 2.1 M/S
TRICUSPID ANNULAR PLANE SYSTOLIC EXCURSION: 2.2 CM
TRICUSPID VALVE PEAK REGURGITATION VELOCITY: 2.13 M/S

## 2024-11-26 ENCOUNTER — TELEPHONE (OUTPATIENT)
Dept: HEMATOLOGY ONCOLOGY | Facility: CLINIC | Age: 74
End: 2024-11-26

## 2024-12-11 ENCOUNTER — OFFICE VISIT (OUTPATIENT)
Dept: FAMILY MEDICINE CLINIC | Facility: CLINIC | Age: 74
End: 2024-12-11
Payer: COMMERCIAL

## 2024-12-11 VITALS
TEMPERATURE: 97.6 F | HEART RATE: 82 BPM | RESPIRATION RATE: 14 BRPM | BODY MASS INDEX: 37.13 KG/M2 | HEIGHT: 68 IN | OXYGEN SATURATION: 98 % | WEIGHT: 245 LBS | SYSTOLIC BLOOD PRESSURE: 136 MMHG | DIASTOLIC BLOOD PRESSURE: 82 MMHG

## 2024-12-11 DIAGNOSIS — E78.5 HYPERLIPIDEMIA ASSOCIATED WITH TYPE 2 DIABETES MELLITUS  (HCC): ICD-10-CM

## 2024-12-11 DIAGNOSIS — Z12.11 SCREEN FOR COLON CANCER: ICD-10-CM

## 2024-12-11 DIAGNOSIS — I50.32 CHRONIC DIASTOLIC (CONGESTIVE) HEART FAILURE (HCC): ICD-10-CM

## 2024-12-11 DIAGNOSIS — E11.69 HYPERLIPIDEMIA ASSOCIATED WITH TYPE 2 DIABETES MELLITUS  (HCC): ICD-10-CM

## 2024-12-11 DIAGNOSIS — K21.9 GASTROESOPHAGEAL REFLUX DISEASE WITHOUT ESOPHAGITIS: ICD-10-CM

## 2024-12-11 DIAGNOSIS — M81.8 IDIOPATHIC OSTEOPOROSIS: ICD-10-CM

## 2024-12-11 DIAGNOSIS — E53.8 VITAMIN B12 DEFICIENCY: ICD-10-CM

## 2024-12-11 DIAGNOSIS — N40.0 ENLARGED PROSTATE: ICD-10-CM

## 2024-12-11 DIAGNOSIS — E11.8 TYPE II DIABETES MELLITUS WITH MANIFESTATIONS (HCC): Primary | ICD-10-CM

## 2024-12-11 LAB — SL AMB POCT HEMOGLOBIN AIC: 7.4 (ref ?–6.5)

## 2024-12-11 PROCEDURE — 99214 OFFICE O/P EST MOD 30 MIN: CPT | Performed by: INTERNAL MEDICINE

## 2024-12-11 PROCEDURE — 83036 HEMOGLOBIN GLYCOSYLATED A1C: CPT | Performed by: INTERNAL MEDICINE

## 2024-12-11 NOTE — PROGRESS NOTES
Name: Hayes Osman      : 1950      MRN: [id number]  Encounter Provider: Ken Caldwell MD  Encounter Date: 2024   Encounter department: [facility]ARE [facility]  :  Assessment & Plan  Vitamin B12 deficiency         Chronic diastolic (congestive) heart failure (HCC)  Wt Readings from Last 3 Encounters:   24 111 kg (245 lb)   24 113 kg (250 lb)   24 113 kg (250 lb)     Stable  Continue same  FU [facility]ology  [facility]               Hyperlipidemia associated with type 2 diabetes mellitus  (HCC)    Lab Results   Component Value Date    HGBA1C 7.4 (A) 2024       Orders:    Ambulatory Referral to Podiatry; Future    POCT hemoglobin A1c    Comprehensive metabolic panel; Future    CBC and differential; Future    Lipid Panel with Direct LDL reflex; Future    TSH, 3rd generation with Free T4 reflex; Future    PSA Total, Diagnostic; Future    Quantiferon TB Gold Plus Assay; Future    H. pylori antigen, stool; Future    Occult Blood, Fecal Immunochemical; Future    UA (URINE) with reflex to Scope; Future    Magnesium; Future    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)    Lab Results   Component Value Date    HGBA1C 7.4 (A) 2024   Stable  Life style Mod  Continue same  RTC in 3 mos w  ;    Orders:    Ambulatory Referral to Podiatry; Future    POCT hemoglobin A1c    Comprehensive metabolic panel; Future    CBC and differential; Future    Lipid Panel with Direct LDL reflex; Future    TSH, 3rd generation with Free T4 reflex; Future    PSA Total, Diagnostic; Future    Quantiferon TB Gold Plus Assay; Future    H. pylori antigen, stool; Future    Occult Blood, Fecal Immunochemical; Future    UA (URINE) with reflex to Scope; Future    Magnesium; Future    Idiopathic osteoporosis  Caltrate Plus D daily  RTC in 3 mos w  dexa Scan       Screen for colon cancer    Orders:    Ambulatory Referral to Podiatry; Future    PSA Total, Diagnostic; Future     Quantiferon TB Gold Plus Assay; Future    H. pylori antigen, stool; Future    Occult Blood, Fecal Immunochemical; Future    UA (URINE) with reflex to Scope; Future    Magnesium; Future    Gastroesophageal reflux disease without esophagitis  Life style Mod  RTC in 3 mos w :  Orders:    Ambulatory Referral to Podiatry; Future    PSA Total, Diagnostic; Future    Quantiferon TB Gold Plus Assay; Future    H. pylori antigen, stool; Future    Occult Blood, Fecal Immunochemical; Future    UA (URINE) with reflex to Scope; Future    Magnesium; Future    Enlarged prostate  FU w Urology  Life style Mod  RTC in 3 mos w ;  Orders:    Ambulatory Referral to Podiatry; Future    PSA Total, Diagnostic; Future    Quantiferon TB Gold Plus Assay; Future    H. pylori antigen, stool; Future    Occult Blood, Fecal Immunochemical; Future    UA (URINE) with reflex to Scope; Future    Magnesium; Future           History of Present Illness     74 Y O Man is here for Regular check up, he feels OK, recent blood work and med list reviewed,...      Review of Systems   Constitutional:  Negative for chills, fatigue and fever.   HENT:  Negative for congestion, facial swelling, sore throat, trouble swallowing and voice change.    Eyes:  Negative for pain, discharge and visual disturbance.   Respiratory:  Negative for cough, shortness of breath and wheezing.    Cardiovascular:  Negative for chest pain, palpitations and leg swelling.   Gastrointestinal:  Negative for abdominal pain, blood in stool, constipation, diarrhea and nausea.   Endocrine: Negative for polydipsia, polyphagia and polyuria.   Genitourinary:  Negative for difficulty urinating, hematuria and urgency.   Musculoskeletal:  Negative for arthralgias and myalgias.   Skin:  Negative for rash.   Neurological:  Negative for dizziness, tremors, weakness and headaches.   Hematological:  Negative for adenopathy. Does not bruise/bleed easily.   Psychiatric/Behavioral:  Negative for dysphoric mood,  "sleep disturbance and suicidal ideas.        Objective   /82 (BP Location: Left arm, Patient Position: Sitting, Cuff Size: Standard)   Pulse 82   Temp 97.6 °F (36.4 °C) (Tympanic Core)   Resp 14   Ht 5' 8\" (1.727 m)   Wt 111 kg (245 lb)   SpO2 98%   BMI 37.25 kg/m²      Physical Exam  Constitutional:       General: He is not in acute distress.     Appearance: He is well-developed.   HENT:      Head: Normocephalic.      Right Ear: External ear normal.      Left Ear: External ear normal.   Eyes:      Pupils: Pupils are equal, round, and reactive to light.   Neck:      Thyroid: No thyromegaly.      Trachea: No tracheal deviation.   Cardiovascular:      Rate and Rhythm: Normal rate and regular rhythm.      Heart sounds: Murmur heard.      No friction rub.   Pulmonary:      Effort: Pulmonary effort is normal. No respiratory distress.      Breath sounds: Normal breath sounds. No wheezing.   Abdominal:      General: Bowel sounds are normal. There is no distension.      Palpations: Abdomen is soft.   Musculoskeletal:         General: Tenderness present. No deformity. Normal range of motion.      Cervical back: Neck supple.   Skin:     General: Skin is warm and dry.      Findings: No erythema or rash.   Neurological:      Mental Status: He is alert and oriented to person, place, and time.      Cranial Nerves: No cranial nerve deficit.      Coordination: Coordination normal.      Deep Tendon Reflexes: Reflexes normal.      Comments: Pt uses a cane to support gait   Psychiatric:         Behavior: Behavior normal.         "

## 2024-12-11 NOTE — ASSESSMENT & PLAN NOTE
Wt Readings from Last 3 Encounters:   12/11/24 111 kg (245 lb)   09/27/24 113 kg (250 lb)   09/06/24 113 kg (250 lb)     Stable  Continue same  ANADN w cardiology  ANAND w [facility]

## 2024-12-11 NOTE — PATIENT INSTRUCTIONS

## 2024-12-13 ENCOUNTER — APPOINTMENT (OUTPATIENT)
Dept: LAB | Facility: HOSPITAL | Age: 74
End: 2024-12-13
Payer: COMMERCIAL

## 2024-12-13 DIAGNOSIS — E83.42 HYPOMAGNESEMIA: ICD-10-CM

## 2024-12-13 DIAGNOSIS — K21.9 GASTROESOPHAGEAL REFLUX DISEASE WITHOUT ESOPHAGITIS: ICD-10-CM

## 2024-12-13 DIAGNOSIS — N40.0 ENLARGED PROSTATE: ICD-10-CM

## 2024-12-13 DIAGNOSIS — E11.8 TYPE II DIABETES MELLITUS WITH MANIFESTATIONS (HCC): ICD-10-CM

## 2024-12-13 DIAGNOSIS — R79.0 LOW FERRITIN: ICD-10-CM

## 2024-12-13 DIAGNOSIS — E11.69 HYPERLIPIDEMIA ASSOCIATED WITH TYPE 2 DIABETES MELLITUS  (HCC): ICD-10-CM

## 2024-12-13 DIAGNOSIS — E78.5 HYPERLIPIDEMIA ASSOCIATED WITH TYPE 2 DIABETES MELLITUS  (HCC): ICD-10-CM

## 2024-12-13 DIAGNOSIS — Z12.11 SCREEN FOR COLON CANCER: ICD-10-CM

## 2024-12-13 DIAGNOSIS — E87.6 HYPOKALEMIA: Primary | ICD-10-CM

## 2024-12-13 LAB
ALBUMIN SERPL BCG-MCNC: 4.6 G/DL (ref 3.5–5)
ALP SERPL-CCNC: 88 U/L (ref 34–104)
ALT SERPL W P-5'-P-CCNC: 24 U/L (ref 7–52)
ANION GAP SERPL CALCULATED.3IONS-SCNC: 11 MMOL/L (ref 4–13)
AST SERPL W P-5'-P-CCNC: 17 U/L (ref 13–39)
BASOPHILS # BLD AUTO: 0.02 THOUSANDS/ÂΜL (ref 0–0.1)
BASOPHILS NFR BLD AUTO: 0 % (ref 0–1)
BILIRUB SERPL-MCNC: 0.76 MG/DL (ref 0.2–1)
BUN SERPL-MCNC: 21 MG/DL (ref 5–25)
CALCIUM SERPL-MCNC: 9.6 MG/DL (ref 8.4–10.2)
CHLORIDE SERPL-SCNC: 100 MMOL/L (ref 96–108)
CHOLEST SERPL-MCNC: 93 MG/DL (ref ?–200)
CO2 SERPL-SCNC: 28 MMOL/L (ref 21–32)
CREAT SERPL-MCNC: 1 MG/DL (ref 0.6–1.3)
EOSINOPHIL # BLD AUTO: 0.11 THOUSAND/ÂΜL (ref 0–0.61)
EOSINOPHIL NFR BLD AUTO: 2 % (ref 0–6)
ERYTHROCYTE [DISTWIDTH] IN BLOOD BY AUTOMATED COUNT: 14.3 % (ref 11.6–15.1)
FERRITIN SERPL-MCNC: 15 NG/ML (ref 24–336)
GFR SERPL CREATININE-BSD FRML MDRD: 74 ML/MIN/1.73SQ M
GLUCOSE P FAST SERPL-MCNC: 123 MG/DL (ref 65–99)
HCT VFR BLD AUTO: 38 % (ref 36.5–49.3)
HDLC SERPL-MCNC: 43 MG/DL
HGB BLD-MCNC: 12.5 G/DL (ref 12–17)
IMM GRANULOCYTES # BLD AUTO: 0.01 THOUSAND/UL (ref 0–0.2)
IMM GRANULOCYTES NFR BLD AUTO: 0 % (ref 0–2)
IRON SATN MFR SERPL: 26 % (ref 15–50)
IRON SERPL-MCNC: 109 UG/DL (ref 50–212)
LDLC SERPL CALC-MCNC: 19 MG/DL (ref 0–100)
LYMPHOCYTES # BLD AUTO: 1.65 THOUSANDS/ÂΜL (ref 0.6–4.47)
LYMPHOCYTES NFR BLD AUTO: 26 % (ref 14–44)
MAGNESIUM SERPL-MCNC: 1.6 MG/DL (ref 1.9–2.7)
MCH RBC QN AUTO: 31 PG (ref 26.8–34.3)
MCHC RBC AUTO-ENTMCNC: 32.9 G/DL (ref 31.4–37.4)
MCV RBC AUTO: 94 FL (ref 82–98)
MONOCYTES # BLD AUTO: 0.6 THOUSAND/ÂΜL (ref 0.17–1.22)
MONOCYTES NFR BLD AUTO: 10 % (ref 4–12)
NEUTROPHILS # BLD AUTO: 3.93 THOUSANDS/ÂΜL (ref 1.85–7.62)
NEUTS SEG NFR BLD AUTO: 62 % (ref 43–75)
NRBC BLD AUTO-RTO: 0 /100 WBCS
PLATELET # BLD AUTO: 297 THOUSANDS/UL (ref 149–390)
PMV BLD AUTO: 10.9 FL (ref 8.9–12.7)
POTASSIUM SERPL-SCNC: 3.1 MMOL/L (ref 3.5–5.3)
PROT SERPL-MCNC: 7.8 G/DL (ref 6.4–8.4)
PSA SERPL-MCNC: 0.57 NG/ML (ref 0–4)
RBC # BLD AUTO: 4.03 MILLION/UL (ref 3.88–5.62)
SODIUM SERPL-SCNC: 139 MMOL/L (ref 135–147)
TIBC SERPL-MCNC: 417 UG/DL (ref 250–450)
TRIGL SERPL-MCNC: 154 MG/DL (ref ?–150)
TSH SERPL DL<=0.05 MIU/L-ACNC: 0.76 UIU/ML (ref 0.45–4.5)
UIBC SERPL-MCNC: 308 UG/DL (ref 155–355)
WBC # BLD AUTO: 6.32 THOUSAND/UL (ref 4.31–10.16)

## 2024-12-13 PROCEDURE — 84153 ASSAY OF PSA TOTAL: CPT

## 2024-12-13 PROCEDURE — 86480 TB TEST CELL IMMUN MEASURE: CPT

## 2024-12-13 PROCEDURE — 80061 LIPID PANEL: CPT

## 2024-12-13 PROCEDURE — 36415 COLL VENOUS BLD VENIPUNCTURE: CPT

## 2024-12-13 PROCEDURE — 83735 ASSAY OF MAGNESIUM: CPT

## 2024-12-13 PROCEDURE — 84443 ASSAY THYROID STIM HORMONE: CPT

## 2024-12-13 PROCEDURE — 83550 IRON BINDING TEST: CPT

## 2024-12-13 PROCEDURE — 83540 ASSAY OF IRON: CPT

## 2024-12-13 PROCEDURE — 82728 ASSAY OF FERRITIN: CPT

## 2024-12-13 PROCEDURE — 85025 COMPLETE CBC W/AUTO DIFF WBC: CPT

## 2024-12-13 PROCEDURE — 80053 COMPREHEN METABOLIC PANEL: CPT

## 2024-12-13 RX ORDER — POTASSIUM CHLORIDE 750 MG/1
10 CAPSULE, EXTENDED RELEASE ORAL DAILY
Qty: 30 CAPSULE | Refills: 3 | Status: SHIPPED | OUTPATIENT
Start: 2024-12-13 | End: 2024-12-16 | Stop reason: SDUPTHER

## 2024-12-13 RX ORDER — LANOLIN ALCOHOL/MO/W.PET/CERES
400 CREAM (GRAM) TOPICAL 2 TIMES DAILY
Qty: 60 TABLET | Refills: 3 | Status: SHIPPED | OUTPATIENT
Start: 2024-12-13 | End: 2024-12-16 | Stop reason: SDUPTHER

## 2024-12-15 LAB
GAMMA INTERFERON BACKGROUND BLD IA-ACNC: 0 IU/ML
M TB IFN-G BLD-IMP: NEGATIVE
M TB IFN-G CD4+ BCKGRND COR BLD-ACNC: 0.31 IU/ML
M TB IFN-G CD4+ BCKGRND COR BLD-ACNC: 0.32 IU/ML
MITOGEN IGNF BCKGRD COR BLD-ACNC: 10 IU/ML

## 2024-12-16 ENCOUNTER — RESULTS FOLLOW-UP (OUTPATIENT)
Dept: FAMILY MEDICINE CLINIC | Facility: CLINIC | Age: 74
End: 2024-12-16

## 2024-12-16 DIAGNOSIS — E87.6 HYPOKALEMIA: ICD-10-CM

## 2024-12-16 DIAGNOSIS — E83.42 HYPOMAGNESEMIA: ICD-10-CM

## 2024-12-16 RX ORDER — LANOLIN ALCOHOL/MO/W.PET/CERES
400 CREAM (GRAM) TOPICAL 2 TIMES DAILY
Qty: 60 TABLET | Refills: 3 | Status: SHIPPED | OUTPATIENT
Start: 2024-12-16

## 2024-12-16 RX ORDER — POTASSIUM CHLORIDE 750 MG/1
10 CAPSULE, EXTENDED RELEASE ORAL DAILY
Qty: 30 CAPSULE | Refills: 3 | Status: SHIPPED | OUTPATIENT
Start: 2024-12-16

## 2025-01-09 ENCOUNTER — OFFICE VISIT (OUTPATIENT)
Dept: HEMATOLOGY ONCOLOGY | Facility: CLINIC | Age: 75
End: 2025-01-09
Payer: COMMERCIAL

## 2025-01-09 VITALS
HEIGHT: 68 IN | RESPIRATION RATE: 17 BRPM | BODY MASS INDEX: 37.05 KG/M2 | OXYGEN SATURATION: 95 % | HEART RATE: 101 BPM | SYSTOLIC BLOOD PRESSURE: 148 MMHG | DIASTOLIC BLOOD PRESSURE: 96 MMHG | WEIGHT: 244.5 LBS | TEMPERATURE: 97.9 F

## 2025-01-09 DIAGNOSIS — R79.0 LOW FERRITIN: Primary | ICD-10-CM

## 2025-01-09 PROCEDURE — 99214 OFFICE O/P EST MOD 30 MIN: CPT | Performed by: NURSE PRACTITIONER

## 2025-01-09 NOTE — LETTER
2025     Ken Caldwell MD  [facility] [place] [id number]    Patient: Hayes Osman   YOB: 1950   Date of Visit: 2025       Dear Dr. Caldwell:    Thank you for referring Hayes Osman to me for evaluation. Below are my notes for this consultation.    If you have questions, please do not hesitate to call me. I look forward to following your patient along with you.         Sincerely,        BETSY Mullins        CC: No Recipients    BETSY Mullins  2025  1:30 PM  Sign when Signing Visit  Name: Hayes Osman      : 1950      MRN: [id number]  Encounter Provider: BETSY Mullins  Encounter Date: 2025   Encounter department: [facility] [place]  :  Assessment & Plan  Low ferritin   Most recent labs from 2024 show normal CBC and differential, normal CMP, iron saturation 26% with a ferritin level of 15 previously 12.  Patient is taking oral iron and reports tolerating without difficulty.  He is also taking a vitamin B12 tablet.  We discussed continuing to follow with labs versus discharge back to PCP.  Patient prefers to follow with his PCP.  I recommend checking an iron panel in 6 to 12 months.  We will see him on a as needed basis.  He is up-to-date on colonoscopy and PSA.       History of Present Illness  Chief Complaint   Patient presents with   • Follow-up     Pertinent Medical History  25: Patient is a 74-year-old male with a history of type 2 diabetes, chronic low back pain, hyperlipidemia, hypertension, myocardial infarction, and sleep apnea initially referred for evaluation of low ferritin.  He has been on oral iron tolerating without difficulty.  He is up-to-date on colonoscopy in .     Review of Systems   Constitutional:  Negative for activity change, appetite change, fatigue, fever and unexpected weight change.   Respiratory:  Negative for cough and shortness of breath.    Cardiovascular:   "Negative for chest pain and leg swelling.   Gastrointestinal:  Negative for abdominal pain, constipation, diarrhea and nausea.   Endocrine: Negative for cold intolerance and heat intolerance.   Musculoskeletal:  Negative for arthralgias and myalgias.   Skin: Negative.    Neurological:  Negative for dizziness, weakness and headaches.   Hematological:  Negative for adenopathy. Does not bruise/bleed easily.       Objective  /96 (BP Location: Left arm, Patient Position: Sitting, Cuff Size: Adult)   Pulse 101   Temp 97.9 °F (36.6 °C) (Temporal)   Resp 17   Ht 5' 8\" (1.727 m)   Wt 111 kg (244 lb 8 oz)   SpO2 95%   BMI 37.18 kg/m²     Physical Exam  Vitals reviewed.   Constitutional:       Appearance: Normal appearance. He is well-developed.   HENT:      Head: Normocephalic and atraumatic.   Eyes:      Pupils: Pupils are equal, round, and reactive to light.   Pulmonary:      Effort: Pulmonary effort is normal. No respiratory distress.   Musculoskeletal:         General: Normal range of motion.      Cervical back: Normal range of motion.   Lymphadenopathy:      Cervical: No cervical adenopathy.   Skin:     General: Skin is dry.   Neurological:      Mental Status: He is alert and oriented to person, place, and time.   Psychiatric:         Behavior: Behavior normal.     Labs: I have reviewed the following labs:  Results for orders placed or performed in visit on 12/13/24   Comprehensive metabolic panel   Result Value Ref Range    Sodium 139 135 - 147 mmol/L    Potassium 3.1 (L) 3.5 - 5.3 mmol/L    Chloride 100 96 - 108 mmol/L    CO2 28 21 - 32 mmol/L    ANION GAP 11 4 - 13 mmol/L    BUN 21 5 - 25 mg/dL    Creatinine 1.00 0.60 - 1.30 mg/dL    Glucose, Fasting 123 (H) 65 - 99 mg/dL    Calcium 9.6 8.4 - 10.2 mg/dL    AST 17 13 - 39 U/L    ALT 24 7 - 52 U/L    Alkaline Phosphatase 88 34 - 104 U/L    Total Protein 7.8 6.4 - 8.4 g/dL    Albumin 4.6 3.5 - 5.0 g/dL    Total Bilirubin 0.76 0.20 - 1.00 mg/dL    eGFR 74 " ml/min/1.73sq m   CBC and differential   Result Value Ref Range    WBC 6.32 4.31 - 10.16 Thousand/uL    RBC 4.03 3.88 - 5.62 Million/uL    Hemoglobin 12.5 12.0 - 17.0 g/dL    Hematocrit 38.0 36.5 - 49.3 %    MCV 94 82 - 98 fL    MCH 31.0 26.8 - 34.3 pg    MCHC 32.9 31.4 - 37.4 g/dL    RDW 14.3 11.6 - 15.1 %    MPV 10.9 8.9 - 12.7 fL    Platelets 297 149 - 390 Thousands/uL    nRBC 0 /100 WBCs    Segmented % 62 43 - 75 %    Immature Grans % 0 0 - 2 %    Lymphocytes % 26 14 - 44 %    Monocytes % 10 4 - 12 %    Eosinophils Relative 2 0 - 6 %    Basophils Relative 0 0 - 1 %    Absolute Neutrophils 3.93 1.85 - 7.62 Thousands/µL    Absolute Immature Grans 0.01 0.00 - 0.20 Thousand/uL    Absolute Lymphocytes 1.65 0.60 - 4.47 Thousands/µL    Absolute Monocytes 0.60 0.17 - 1.22 Thousand/µL    Eosinophils Absolute 0.11 0.00 - 0.61 Thousand/µL    Basophils Absolute 0.02 0.00 - 0.10 Thousands/µL   Lipid Panel with Direct LDL reflex   Result Value Ref Range    Cholesterol 93 See Comment mg/dL    Triglycerides 154 (H) See Comment mg/dL    HDL, Direct 43 >=40 mg/dL    LDL Calculated 19 0 - 100 mg/dL   TSH, 3rd generation with Free T4 reflex   Result Value Ref Range    TSH 3RD GENERATON 0.762 0.450 - 4.500 uIU/mL   PSA Total, Diagnostic   Result Value Ref Range    PSA, Diagnostic 0.571 0.000 - 4.000 ng/mL   Result Value Ref Range    Magnesium 1.6 (L) 1.9 - 2.7 mg/dL   TIBC Panel (incl. Iron, TIBC, % Iron Saturation)   Result Value Ref Range    Iron Saturation 26 15 - 50 %    TIBC 417 250 - 450 ug/dL    Iron 109 50 - 212 ug/dL    UIBC 308 155 - 355 ug/dL   Result Value Ref Range    Ferritin 15 (L) 24 - 336 ng/mL   Quantiferon TB Gold Plus Gray   Result Value Ref Range    QFT Nil 0.00 0 - 8.0 IU/ml   Quantiferon TB Gold Plus Green   Result Value Ref Range    QFT TB1-NIL 0.31 IU/ml   Quantiferon TB Gold Plus Yellow   Result Value Ref Range    QFT TB2-NIL 0.32 IU/ml   Quantiferon TB Gold Plus Purple   Result Value Ref Range    QFT  Mitogen-NIL 10.00 IU/ml    QFT Final Interpretation Negative Negative

## 2025-01-09 NOTE — PROGRESS NOTES
Name: Hayes Osman      : 1950      MRN: [id number]  Encounter Provider: BETSY Mullins  Encounter Date: 2025   Encounter department: [facility] [place]  :  Assessment & Plan  Low ferritin   Most recent labs from 2024 show normal CBC and differential, normal CMP, iron saturation 26% with a ferritin level of 15 previously 12.  Patient is taking oral iron and reports tolerating without difficulty.  He is also taking a vitamin B12 tablet.  I advised him to take the supplements separately from the pantoprazole as it decreases the absorption.  We discussed continuing to follow with labs versus discharge back to PCP.  Patient prefers to follow with his PCP.  I recommend checking an iron panel in 6 to 12 months.  We will see him on a as needed basis.  He is up-to-date on colonoscopy and PSA.       History of Present Illness   Chief Complaint   Patient presents with    Follow-up     Pertinent Medical History   25: Patient is a 74-year-old male with a history of type 2 diabetes, chronic low back pain, hyperlipidemia, hypertension, myocardial infarction, and sleep apnea initially referred for evaluation of low ferritin.  He has been on oral iron tolerating without difficulty.  He is up-to-date on colonoscopy in .     Review of Systems   Constitutional:  Negative for activity change, appetite change, fatigue, fever and unexpected weight change.   Respiratory:  Negative for cough and shortness of breath.    Cardiovascular:  Negative for chest pain and leg swelling.   Gastrointestinal:  Negative for abdominal pain, constipation, diarrhea and nausea.   Endocrine: Negative for cold intolerance and heat intolerance.   Musculoskeletal:  Negative for arthralgias and myalgias.   Skin: Negative.    Neurological:  Negative for dizziness, weakness and headaches.   Hematological:  Negative for adenopathy. Does not bruise/bleed easily.       Objective   /96 (BP  "Location: Left arm, Patient Position: Sitting, Cuff Size: Adult)   Pulse 101   Temp 97.9 °F (36.6 °C) (Temporal)   Resp 17   Ht 5' 8\" (1.727 m)   Wt 111 kg (244 lb 8 oz)   SpO2 95%   BMI 37.18 kg/m²     Physical Exam  Vitals reviewed.   Constitutional:       Appearance: Normal appearance. He is well-developed.   HENT:      Head: Normocephalic and atraumatic.   Eyes:      Pupils: Pupils are equal, round, and reactive to light.   Pulmonary:      Effort: Pulmonary effort is normal. No respiratory distress.   Musculoskeletal:         General: Normal range of motion.      Cervical back: Normal range of motion.   Lymphadenopathy:      Cervical: No cervical adenopathy.   Skin:     General: Skin is dry.   Neurological:      Mental Status: He is alert and oriented to person, place, and time.   Psychiatric:         Behavior: Behavior normal.     Labs: I have reviewed the following labs:  Results for orders placed or performed in visit on 12/13/24   Comprehensive metabolic panel   Result Value Ref Range    Sodium 139 135 - 147 mmol/L    Potassium 3.1 (L) 3.5 - 5.3 mmol/L    Chloride 100 96 - 108 mmol/L    CO2 28 21 - 32 mmol/L    ANION GAP 11 4 - 13 mmol/L    BUN 21 5 - 25 mg/dL    Creatinine 1.00 0.60 - 1.30 mg/dL    Glucose, Fasting 123 (H) 65 - 99 mg/dL    Calcium 9.6 8.4 - 10.2 mg/dL    AST 17 13 - 39 U/L    ALT 24 7 - 52 U/L    Alkaline Phosphatase 88 34 - 104 U/L    Total Protein 7.8 6.4 - 8.4 g/dL    Albumin 4.6 3.5 - 5.0 g/dL    Total Bilirubin 0.76 0.20 - 1.00 mg/dL    eGFR 74 ml/min/1.73sq m   CBC and differential   Result Value Ref Range    WBC 6.32 4.31 - 10.16 Thousand/uL    RBC 4.03 3.88 - 5.62 Million/uL    Hemoglobin 12.5 12.0 - 17.0 g/dL    Hematocrit 38.0 36.5 - 49.3 %    MCV 94 82 - 98 fL    MCH 31.0 26.8 - 34.3 pg    MCHC 32.9 31.4 - 37.4 g/dL    RDW 14.3 11.6 - 15.1 %    MPV 10.9 8.9 - 12.7 fL    Platelets 297 149 - 390 Thousands/uL    nRBC 0 /100 WBCs    Segmented % 62 43 - 75 %    Immature Grans % " 0 0 - 2 %    Lymphocytes % 26 14 - 44 %    Monocytes % 10 4 - 12 %    Eosinophils Relative 2 0 - 6 %    Basophils Relative 0 0 - 1 %    Absolute Neutrophils 3.93 1.85 - 7.62 Thousands/µL    Absolute Immature Grans 0.01 0.00 - 0.20 Thousand/uL    Absolute Lymphocytes 1.65 0.60 - 4.47 Thousands/µL    Absolute Monocytes 0.60 0.17 - 1.22 Thousand/µL    Eosinophils Absolute 0.11 0.00 - 0.61 Thousand/µL    Basophils Absolute 0.02 0.00 - 0.10 Thousands/µL   Lipid Panel with Direct LDL reflex   Result Value Ref Range    Cholesterol 93 See Comment mg/dL    Triglycerides 154 (H) See Comment mg/dL    HDL, Direct 43 >=40 mg/dL    LDL Calculated 19 0 - 100 mg/dL   TSH, 3rd generation with Free T4 reflex   Result Value Ref Range    TSH 3RD GENERATON 0.762 0.450 - 4.500 uIU/mL   PSA Total, Diagnostic   Result Value Ref Range    PSA, Diagnostic 0.571 0.000 - 4.000 ng/mL   Result Value Ref Range    Magnesium 1.6 (L) 1.9 - 2.7 mg/dL   TIBC Panel (incl. Iron, TIBC, % Iron Saturation)   Result Value Ref Range    Iron Saturation 26 15 - 50 %    TIBC 417 250 - 450 ug/dL    Iron 109 50 - 212 ug/dL    UIBC 308 155 - 355 ug/dL   Result Value Ref Range    Ferritin 15 (L) 24 - 336 ng/mL   Quantiferon TB Gold Plus Gray   Result Value Ref Range    QFT Nil 0.00 0 - 8.0 IU/ml   Quantiferon TB Gold Plus Green   Result Value Ref Range    QFT TB1-NIL 0.31 IU/ml   Quantiferon TB Gold Plus Yellow   Result Value Ref Range    QFT TB2-NIL 0.32 IU/ml   Quantiferon TB Gold Plus Purple   Result Value Ref Range    QFT Mitogen-NIL 10.00 IU/ml    QFT Final Interpretation Negative Negative

## 2025-01-09 NOTE — PATIENT INSTRUCTIONS
Take the oral iron and vitamin B12 separately from the pantoprazole (protonix). Iron is better absorbed with acid and the protonix decreases the acid. You can take the iron and B12 together about an hour before or after the protonix.

## 2025-01-14 PROBLEM — R79.0 LOW FERRITIN: Status: ACTIVE | Noted: 2025-01-14

## 2025-01-14 NOTE — ASSESSMENT & PLAN NOTE
Most recent labs from 12/13/2024 show normal CBC and differential, normal CMP, iron saturation 26% with a ferritin level of 15 previously 12.  Patient is taking oral iron and reports tolerating without difficulty.  He is also taking a vitamin B12 tablet.  I advised him to take the supplements separately from the pantoprazole as it decreases the absorption.  We discussed continuing to follow with labs versus discharge back to PCP.  Patient prefers to follow with his PCP.  I recommend checking an iron panel in 6 to 12 months.  We will see him on a as needed basis.  He is up-to-date on colonoscopy and PSA.

## 2025-02-26 ENCOUNTER — TELEPHONE (OUTPATIENT)
Age: 75
End: 2025-02-26

## 2025-02-26 NOTE — TELEPHONE ENCOUNTER
Patient called, request t reschedule 3/19/2025 OVS for self and spouse. States they are oing out of town , request for a sooner date. Confirmed provider availability. Patient requests  a callback with suggestions., Please advise Patient at [phone], if any further questions.

## 2025-03-05 ENCOUNTER — OFFICE VISIT (OUTPATIENT)
Dept: FAMILY MEDICINE CLINIC | Facility: CLINIC | Age: 75
End: 2025-03-05
Payer: COMMERCIAL

## 2025-03-05 VITALS
OXYGEN SATURATION: 95 % | HEART RATE: 89 BPM | TEMPERATURE: 97.6 F | DIASTOLIC BLOOD PRESSURE: 86 MMHG | WEIGHT: 245 LBS | SYSTOLIC BLOOD PRESSURE: 130 MMHG | RESPIRATION RATE: 15 BRPM | BODY MASS INDEX: 37.13 KG/M2 | HEIGHT: 68 IN

## 2025-03-05 DIAGNOSIS — R79.0 LOW FERRITIN: ICD-10-CM

## 2025-03-05 DIAGNOSIS — M81.8 IDIOPATHIC OSTEOPOROSIS: ICD-10-CM

## 2025-03-05 DIAGNOSIS — K21.9 GASTROESOPHAGEAL REFLUX DISEASE WITHOUT ESOPHAGITIS: ICD-10-CM

## 2025-03-05 DIAGNOSIS — F17.211 NICOTINE DEPENDENCE, CIGARETTES, IN REMISSION: ICD-10-CM

## 2025-03-05 DIAGNOSIS — E11.8 TYPE II DIABETES MELLITUS WITH MANIFESTATIONS (HCC): ICD-10-CM

## 2025-03-05 DIAGNOSIS — Z12.11 COLON CANCER SCREENING: Primary | ICD-10-CM

## 2025-03-05 DIAGNOSIS — M54.50 ACUTE EXACERBATION OF CHRONIC LOW BACK PAIN: ICD-10-CM

## 2025-03-05 DIAGNOSIS — G89.29 ACUTE EXACERBATION OF CHRONIC LOW BACK PAIN: ICD-10-CM

## 2025-03-05 DIAGNOSIS — M54.16 LUMBAR RADICULOPATHY: ICD-10-CM

## 2025-03-05 DIAGNOSIS — K58.0 IRRITABLE BOWEL SYNDROME WITH DIARRHEA: ICD-10-CM

## 2025-03-05 DIAGNOSIS — E83.42 HYPOMAGNESEMIA: ICD-10-CM

## 2025-03-05 DIAGNOSIS — E87.6 HYPOKALEMIA: ICD-10-CM

## 2025-03-05 DIAGNOSIS — E04.1 THYROID NODULE: ICD-10-CM

## 2025-03-05 PROBLEM — I50.32 CHRONIC DIASTOLIC (CONGESTIVE) HEART FAILURE (HCC): Status: RESOLVED | Noted: 2024-07-15 | Resolved: 2025-03-05

## 2025-03-05 PROCEDURE — G2211 COMPLEX E/M VISIT ADD ON: HCPCS | Performed by: INTERNAL MEDICINE

## 2025-03-05 PROCEDURE — 99214 OFFICE O/P EST MOD 30 MIN: CPT | Performed by: INTERNAL MEDICINE

## 2025-03-05 RX ORDER — GABAPENTIN 100 MG/1
100 CAPSULE ORAL
Qty: 30 CAPSULE | Refills: 0 | Status: SHIPPED | OUTPATIENT
Start: 2025-03-05 | End: 2025-03-05 | Stop reason: SDUPTHER

## 2025-03-05 RX ORDER — POTASSIUM CHLORIDE 750 MG/1
10 CAPSULE, EXTENDED RELEASE ORAL DAILY
Qty: 30 CAPSULE | Refills: 3 | Status: SHIPPED | OUTPATIENT
Start: 2025-03-05

## 2025-03-05 RX ORDER — GABAPENTIN 100 MG/1
100 CAPSULE ORAL
Qty: 90 CAPSULE | Refills: 3 | Status: SHIPPED | OUTPATIENT
Start: 2025-03-05

## 2025-03-05 NOTE — PROGRESS NOTES
Name: Hayes Osman      : 1950      MRN: [id number]  Encounter Provider: Ken Caldwell MD  Encounter Date: 3/5/2025   Encounter department: [facility]  :  Assessment & Plan  Colon cancer screening    Orders:    Stress test only, exercise; Future    T4, free; Future    Thyroid Antibodies Panel; Future    US thyroid; Future    DXA bone density spine hip and pelvis; Future    CT lung screening program; Future    H. pylori antigen, stool; Future    Comprehensive metabolic panel; Future    Ambulatory referral to Gastroenterology; Future    Low ferritin    Orders:    Stress test only, exercise; Future    TSH, 3rd generation; Future    T4, free; Future    Thyroid Antibodies Panel; Future    US thyroid; Future    DXA bone density spine hip and pelvis; Future    CT lung screening program; Future    H. pylori antigen, stool; Future    Comprehensive metabolic panel; Future    Ambulatory referral to Gastroenterology; Future    Idiopathic osteoporosis    Orders:    Stress test only, exercise; Future    TSH, 3rd generation; Future    T4, free; Future    Thyroid Antibodies Panel; Future    US thyroid; Future    DXA bone density spine hip and pelvis; Future    CT lung screening program; Future    H. pylori antigen, stool; Future    Comprehensive metabolic panel; Future    Ambulatory referral to Gastroenterology; Future    Thyroid nodule    Orders:    Stress test only, exercise; Future    TSH, 3rd generation; Future    T4, free; Future    Thyroid Antibodies Panel; Future    US thyroid; Future    DXA bone density spine hip and pelvis; Future    CT lung screening program; Future    H. pylori antigen, stool; Future    Comprehensive metabolic panel; Future    Ambulatory referral to Gastroenterology; Future    Gastroesophageal reflux disease without esophagitis    Orders:    Stress test only, exercise; Future    TSH, 3rd generation; Future    T4, free; Future    Thyroid Antibodies Panel;  Future    US thyroid; Future    DXA bone density spine hip and pelvis; Future    CT lung screening program; Future    H. pylori antigen, stool; Future    Comprehensive metabolic panel; Future    Ambulatory referral to Gastroenterology; Future    Irritable bowel syndrome with diarrhea    Orders:    Stress test only, exercise; Future    TSH, 3rd generation; Future    T4, free; Future    Thyroid Antibodies Panel; Future    US thyroid; Future    DXA bone density spine hip and pelvis; Future    CT lung screening program; Future    H. pylori antigen, stool; Future    Comprehensive metabolic panel; Future    Ambulatory referral to Gastroenterology; Future    magnesium gluconate (MAGONATE) 500 mg tablet; Take 1 tablet (500 mg total) by mouth 2 (two) times a day Please stop Magnesium Oxide    rifaximin (XIFAXAN) 550 mg tablet; Take 1 tablet (550 mg total) by mouth every 8 (eight) hours    metFORMIN (GLUCOPHAGE) 500 mg tablet; Take 1 tablet (500 mg total) by mouth 2 (two) times a day with meals Please stop metformin 1000 mg    sitaGLIPtin (JANUVIA) 100 mg tablet; Take 1 tablet (100 mg total) by mouth daily after lunch    Lumbar radiculopathy    Orders:    magnesium gluconate (MAGONATE) 500 mg tablet; Take 1 tablet (500 mg total) by mouth 2 (two) times a day Please stop Magnesium Oxide    rifaximin (XIFAXAN) 550 mg tablet; Take 1 tablet (550 mg total) by mouth every 8 (eight) hours    metFORMIN (GLUCOPHAGE) 500 mg tablet; Take 1 tablet (500 mg total) by mouth 2 (two) times a day with meals Please stop metformin 1000 mg    sitaGLIPtin (JANUVIA) 100 mg tablet; Take 1 tablet (100 mg total) by mouth daily after lunch    XR spine lumbar minimum 4 views non injury; Future    Nicotine dependence, cigarettes, in remission    Orders:    CT lung screening program; Future    Acute exacerbation of chronic low back pain    Orders:    gabapentin (NEURONTIN) 100 mg capsule; Take 1 capsule (100 mg total) by mouth 3 (three) times daily after  meals    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)    Lab Results   Component Value Date    HGBA1C 7.4 (A) 12/11/2024   Reduce; metformin to 500 mg  Add; januvia 100 mg daily    Orders:    semaglutide, 2 mg/dose, (Ozempic) 8 mg/ mL injection pen; Inject 0.75 mL (2 mg total) under the skin every 7 days    magnesium gluconate (MAGONATE) 500 mg tablet; Take 1 tablet (500 mg total) by mouth 2 (two) times a day Please stop Magnesium Oxide    rifaximin (XIFAXAN) 550 mg tablet; Take 1 tablet (550 mg total) by mouth every 8 (eight) hours    metFORMIN (GLUCOPHAGE) 500 mg tablet; Take 1 tablet (500 mg total) by mouth 2 (two) times a day with meals Please stop metformin 1000 mg    sitaGLIPtin (JANUVIA) 100 mg tablet; Take 1 tablet (100 mg total) by mouth daily after lunch  RTC in 3 mos w blood work  Hypokalemia    Orders:    potassium chloride (MICRO-K) 10 MEQ CR capsule; Take 1 capsule (10 mEq total) by mouth daily    Hypomagnesemia  Change; Mag Oxide  Into; Mag Gluconate  Orders:    potassium chloride (MICRO-K) 10 MEQ CR capsule; Take 1 capsule (10 mEq total) by mouth daily           History of Present Illness   74 Y O Man is here for Regular check Up, he has few symptoms, recent Blood work and med list reviewed,...      Review of Systems   Constitutional:  Negative for chills, fatigue and fever.   HENT:  Positive for postnasal drip. Negative for congestion, facial swelling, sore throat, trouble swallowing and voice change.    Eyes:  Negative for pain, discharge and visual disturbance.   Respiratory:  Negative for cough, shortness of breath and wheezing.    Cardiovascular:  Negative for chest pain, palpitations and leg swelling.   Gastrointestinal:  Positive for abdominal pain and diarrhea. Negative for blood in stool, constipation and nausea.   Endocrine: Negative for polydipsia, polyphagia and polyuria.   Genitourinary:  Negative for difficulty urinating, hematuria and urgency.   Musculoskeletal:  Positive for  "back pain. Negative for arthralgias and myalgias.   Skin:  Negative for rash.   Neurological:  Positive for numbness. Negative for dizziness, tremors, weakness and headaches.   Hematological:  Negative for adenopathy. Does not bruise/bleed easily.   Psychiatric/Behavioral:  Negative for dysphoric mood, sleep disturbance and suicidal ideas.        Objective   /86 (BP Location: Left arm, Patient Position: Sitting, Cuff Size: Large)   Pulse 89   Temp 97.6 °F (36.4 °C) (Tympanic Core)   Resp 15   Ht 5' 8\" (1.727 m)   Wt 111 kg (245 lb)   SpO2 95%   BMI 37.25 kg/m²      Physical Exam  Constitutional:       General: He is not in acute distress.     Appearance: He is well-developed.   HENT:      Head: Normocephalic.      Right Ear: External ear normal.      Left Ear: External ear normal.   Eyes:      Pupils: Pupils are equal, round, and reactive to light.   Neck:      Thyroid: No thyromegaly.      Trachea: No tracheal deviation.   Cardiovascular:      Rate and Rhythm: Normal rate and regular rhythm.      Heart sounds: Murmur heard.      No friction rub.   Pulmonary:      Effort: Pulmonary effort is normal. No respiratory distress.      Breath sounds: Normal breath sounds. No wheezing.   Abdominal:      General: Bowel sounds are normal. There is no distension.      Palpations: Abdomen is soft.      Tenderness: There is abdominal tenderness.   Musculoskeletal:         General: Tenderness present. No deformity. Normal range of motion.      Cervical back: Neck supple.   Skin:     General: Skin is warm and dry.      Findings: No erythema or rash.   Neurological:      Mental Status: He is alert and oriented to person, place, and time.      Cranial Nerves: No cranial nerve deficit.      Sensory: Sensory deficit present.      Coordination: Coordination normal.      Deep Tendon Reflexes: Reflexes normal.   Psychiatric:         Behavior: Behavior normal.         "

## 2025-03-05 NOTE — ASSESSMENT & PLAN NOTE
Orders:    Stress test only, exercise; Future    TSH, 3rd generation; Future    T4, free; Future    Thyroid Antibodies Panel; Future    US thyroid; Future    DXA bone density spine hip and pelvis; Future    CT lung screening program; Future    H. pylori antigen, stool; Future    Comprehensive metabolic panel; Future    Ambulatory referral to Gastroenterology; Future

## 2025-03-27 ENCOUNTER — TELEPHONE (OUTPATIENT)
Age: 75
End: 2025-03-27

## 2025-04-11 ENCOUNTER — HOSPITAL ENCOUNTER (OUTPATIENT)
Dept: NON INVASIVE DIAGNOSTICS | Facility: HOSPITAL | Age: 75
Discharge: HOME | End: 2025-04-11

## 2025-06-09 ENCOUNTER — OFFICE VISIT (OUTPATIENT)
Dept: FAMILY MEDICINE CLINIC | Facility: CLINIC | Age: 75
End: 2025-06-09
Payer: COMMERCIAL

## 2025-06-09 VITALS
OXYGEN SATURATION: 96 % | TEMPERATURE: 98 F | BODY MASS INDEX: 36.22 KG/M2 | SYSTOLIC BLOOD PRESSURE: 140 MMHG | HEIGHT: 68 IN | WEIGHT: 239 LBS | DIASTOLIC BLOOD PRESSURE: 80 MMHG | HEART RATE: 89 BPM

## 2025-06-09 DIAGNOSIS — K58.0 IRRITABLE BOWEL SYNDROME WITH DIARRHEA: ICD-10-CM

## 2025-06-09 DIAGNOSIS — I50.32 CHRONIC DIASTOLIC (CONGESTIVE) HEART FAILURE (HCC): ICD-10-CM

## 2025-06-09 DIAGNOSIS — Z12.12 SCREENING FOR COLORECTAL CANCER: ICD-10-CM

## 2025-06-09 DIAGNOSIS — E11.69 HYPERLIPIDEMIA ASSOCIATED WITH TYPE 2 DIABETES MELLITUS  (HCC): ICD-10-CM

## 2025-06-09 DIAGNOSIS — E11.8 TYPE II DIABETES MELLITUS WITH MANIFESTATIONS (HCC): ICD-10-CM

## 2025-06-09 DIAGNOSIS — E55.9 VITAMIN D DEFICIENCY: ICD-10-CM

## 2025-06-09 DIAGNOSIS — E78.5 HYPERLIPIDEMIA ASSOCIATED WITH TYPE 2 DIABETES MELLITUS  (HCC): ICD-10-CM

## 2025-06-09 DIAGNOSIS — E53.8 VITAMIN B12 DEFICIENCY: ICD-10-CM

## 2025-06-09 DIAGNOSIS — R21 RASH: Primary | ICD-10-CM

## 2025-06-09 DIAGNOSIS — M54.16 LUMBAR RADICULOPATHY: ICD-10-CM

## 2025-06-09 DIAGNOSIS — I10 PRIMARY HYPERTENSION: ICD-10-CM

## 2025-06-09 DIAGNOSIS — Z12.11 SCREENING FOR COLORECTAL CANCER: ICD-10-CM

## 2025-06-09 LAB — SL AMB POCT HEMOGLOBIN AIC: 7.7 (ref ?–6.5)

## 2025-06-09 PROCEDURE — G2211 COMPLEX E/M VISIT ADD ON: HCPCS | Performed by: INTERNAL MEDICINE

## 2025-06-09 PROCEDURE — 83036 HEMOGLOBIN GLYCOSYLATED A1C: CPT | Performed by: INTERNAL MEDICINE

## 2025-06-09 PROCEDURE — 99214 OFFICE O/P EST MOD 30 MIN: CPT | Performed by: INTERNAL MEDICINE

## 2025-06-09 RX ORDER — VALSARTAN 80 MG/1
80 TABLET ORAL DAILY
Qty: 100 TABLET | Refills: 3 | Status: SHIPPED | OUTPATIENT
Start: 2025-06-09

## 2025-06-09 RX ORDER — CARVEDILOL 12.5 MG/1
12.5 TABLET ORAL 2 TIMES DAILY WITH MEALS
Qty: 200 TABLET | Refills: 3 | Status: SHIPPED | OUTPATIENT
Start: 2025-06-09

## 2025-06-09 RX ORDER — ERGOCALCIFEROL 1.25 MG/1
50000 CAPSULE, LIQUID FILLED ORAL WEEKLY
Qty: 12 CAPSULE | Refills: 2 | Status: SHIPPED | OUTPATIENT
Start: 2025-06-09

## 2025-06-09 RX ORDER — ROSUVASTATIN CALCIUM 5 MG/1
5 TABLET, COATED ORAL DAILY
Qty: 100 TABLET | Refills: 3 | Status: SHIPPED | OUTPATIENT
Start: 2025-06-09

## 2025-06-09 RX ORDER — CLOTRIMAZOLE AND BETAMETHASONE DIPROPIONATE 10; .64 MG/G; MG/G
CREAM TOPICAL 2 TIMES DAILY
Qty: 45 G | Refills: 3 | Status: SHIPPED | OUTPATIENT
Start: 2025-06-09

## 2025-06-09 NOTE — PROGRESS NOTES
Name: Hayes Osman      : 1950      MRN: [id number]  Encounter Provider: Ken Caldwell MD  Encounter Date: 2025   Encounter department: [facility]  :  Assessment & Plan  Screening for colorectal cancer         Chronic diastolic (congestive) heart failure (HCC)  Wt Readings from Last 3 Encounters:   25 108 kg (239 lb)   25 111 kg (245 lb)   25 111 kg (244 lb 8 oz)               Orders:    carvedilol (COREG) 12.5 mg tablet; Take 1 tablet (12.5 mg total) by mouth 2 (two) times a day with meals    Vitamin B12 deficiency    Orders:    cyanocobalamin (VITAMIN B-12) 1000 MCG tablet; Take 1 tablet (1,000 mcg total) by mouth daily    Vitamin D deficiency    Orders:    ergocalciferol (VITAMIN D2) 50,000 units; Take 1 capsule (50,000 Units total) by mouth once a week    Irritable bowel syndrome with diarrhea    Orders:    magnesium gluconate (MAGONATE) 500 mg tablet; Take 1 tablet (500 mg total) by mouth 2 (two) times a day Please stop Magnesium Oxide    metFORMIN (GLUCOPHAGE) 500 mg tablet; Take 1 tablet (500 mg total) by mouth 2 (two) times a day with meals Please stop metformin 1000 mg    sitaGLIPtin (JANUVIA) 100 mg tablet; Take 1 tablet (100 mg total) by mouth daily after lunch    Lumbar radiculopathy    Orders:    magnesium gluconate (MAGONATE) 500 mg tablet; Take 1 tablet (500 mg total) by mouth 2 (two) times a day Please stop Magnesium Oxide    metFORMIN (GLUCOPHAGE) 500 mg tablet; Take 1 tablet (500 mg total) by mouth 2 (two) times a day with meals Please stop metformin 1000 mg    sitaGLIPtin (JANUVIA) 100 mg tablet; Take 1 tablet (100 mg total) by mouth daily after lunch    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)    Lab Results   Component Value Date    HGBA1C 7.7 (A) 2025       Orders:    magnesium gluconate (MAGONATE) 500 mg tablet; Take 1 tablet (500 mg total) by mouth 2 (two) times a day Please stop Magnesium Oxide     metFORMIN (GLUCOPHAGE) 500 mg tablet; Take 1 tablet (500 mg total) by mouth 2 (two) times a day with meals Please stop metformin 1000 mg    semaglutide, 2 mg/dose, (Ozempic) 8 mg/ mL injection pen; Inject 0.75 mL (2 mg total) under the skin every 7 days    sitaGLIPtin (JANUVIA) 100 mg tablet; Take 1 tablet (100 mg total) by mouth daily after lunch    POCT hemoglobin A1c    Comprehensive metabolic panel; Future    CBC and differential; Future    Lipid Panel with Direct LDL reflex; Future    TSH, 3rd generation with Free T4 reflex; Future    UA (URINE) with reflex to Scope; Future    Magnesium; Future    Hyperlipidemia associated with type 2 diabetes mellitus  (HCC)    Lab Results   Component Value Date    HGBA1C 7.7 (A) 06/09/2025     Life style Mod  Continue same  [facility]  Orders:    rosuvastatin (CRESTOR) 5 mg tablet; Take 1 tablet (5 mg total) by mouth daily    POCT hemoglobin A1c    Comprehensive metabolic panel; Future    CBC and differential; Future    Lipid Panel with Direct LDL reflex; Future    TSH, 3rd generation with Free T4 reflex; Future    Primary hypertension  Stable  Continue same  Life style Mod  Renew :  Orders:    valsartan (DIOVAN) 80 mg tablet; Take 1 tablet (80 mg total) by mouth daily    Rash  Try :  Orders:    clotrimazole-betamethasone (LOTRISONE) 1-0.05 % cream; Apply topically 2 (two) times a day           History of Present Illness   74 Y O man is here for Regular Check Up, he has few symptoms, Recent blood work and med list reviewed, [facility],...      Review of Systems   Constitutional:  Negative for chills, fatigue and fever.   HENT:  Negative for congestion, facial swelling, sore throat, trouble swallowing and voice change.    Eyes:  Negative for pain, discharge and visual disturbance.   Respiratory:  Negative for cough, shortness of breath and wheezing.    Cardiovascular:  Negative for chest pain, palpitations and leg swelling.   Gastrointestinal:  Negative for  "abdominal pain, blood in stool, constipation, diarrhea and nausea.   Endocrine: Negative for polydipsia, polyphagia and polyuria.   Genitourinary:  Negative for difficulty urinating, hematuria and urgency.   Musculoskeletal:  Negative for arthralgias and myalgias.   Skin:  Positive for rash.   Neurological:  Negative for dizziness, tremors, weakness and headaches.   Hematological:  Negative for adenopathy. Does not bruise/bleed easily.   Psychiatric/Behavioral:  Negative for dysphoric mood, sleep disturbance and suicidal ideas.        Objective   /80 (BP Location: Left arm, Patient Position: Sitting, Cuff Size: Large)   Pulse 89   Temp 98 °F (36.7 °C)   Ht 5' 8\" (1.727 m)   Wt 108 kg (239 lb)   SpO2 96%   BMI 36.34 kg/m²      Physical Exam  Constitutional:       General: He is not in acute distress.     Appearance: He is well-developed.   HENT:      Head: Normocephalic.      Right Ear: External ear normal.      Left Ear: External ear normal.     Eyes:      Pupils: Pupils are equal, round, and reactive to light.     Neck:      Thyroid: No thyromegaly.      Trachea: No tracheal deviation.     Cardiovascular:      Rate and Rhythm: Normal rate and regular rhythm.      Heart sounds: Murmur heard.      No friction rub.   Pulmonary:      Effort: Pulmonary effort is normal. No respiratory distress.      Breath sounds: Normal breath sounds. No wheezing.   Abdominal:      General: Bowel sounds are normal. There is no distension.      Palpations: Abdomen is soft.     Musculoskeletal:         General: Tenderness present. No deformity. Normal range of motion.      Cervical back: Neck supple.     Skin:     General: Skin is warm and dry.      Findings: Lesion and rash present. No erythema.      Comments: Balanitis, ???     Neurological:      Mental Status: He is alert and oriented to person, place, and time.      Cranial Nerves: No cranial nerve deficit.      Sensory: Sensory deficit present.      Coordination: " Coordination normal.      Deep Tendon Reflexes: Reflexes normal.     Psychiatric:         Behavior: Behavior normal.

## 2025-06-19 ENCOUNTER — TELEPHONE (OUTPATIENT)
Age: 75
End: 2025-06-19

## 2025-06-19 DIAGNOSIS — M19.90 ARTHRITIS: Primary | ICD-10-CM

## 2025-06-19 DIAGNOSIS — M54.16 LUMBAR RADICULOPATHY: ICD-10-CM

## 2025-06-19 RX ORDER — NAPROXEN 500 MG/1
500 TABLET ORAL
Qty: 60 TABLET | Refills: 2 | Status: SHIPPED | OUTPATIENT
Start: 2025-06-19

## 2025-06-19 NOTE — TELEPHONE ENCOUNTER
Patient is requesting a prescription for Naproxen 500 mg for his arthritis. Patient states he was prescribed this in the past, but I do not see this on his medication list. Please advise and return patients call at [phone].

## 2025-06-26 ENCOUNTER — HOSPITAL ENCOUNTER (EMERGENCY)
Facility: HOSPITAL | Age: 75
Discharge: HOME | End: 2025-06-26
Attending: EMERGENCY MEDICINE | Admitting: EMERGENCY MEDICINE
Payer: COMMERCIAL

## 2025-06-26 ENCOUNTER — APPOINTMENT (EMERGENCY)
Dept: CT IMAGING | Facility: HOSPITAL | Age: 75
End: 2025-06-26
Payer: COMMERCIAL

## 2025-06-26 VITALS
SYSTOLIC BLOOD PRESSURE: 117 MMHG | DIASTOLIC BLOOD PRESSURE: 58 MMHG | RESPIRATION RATE: 18 BRPM | OXYGEN SATURATION: 93 % | TEMPERATURE: 98.3 F | HEART RATE: 89 BPM

## 2025-06-26 DIAGNOSIS — M54.9 BACK PAIN: ICD-10-CM

## 2025-06-26 DIAGNOSIS — M99.79 FORAMINAL STENOSIS DUE TO INTERVERTEBRAL DISC DISEASE: ICD-10-CM

## 2025-06-26 DIAGNOSIS — M24.28 LIGAMENTUM FLAVUM HYPERTROPHY: ICD-10-CM

## 2025-06-26 DIAGNOSIS — M51.9 FORAMINAL STENOSIS DUE TO INTERVERTEBRAL DISC DISEASE: ICD-10-CM

## 2025-06-26 DIAGNOSIS — R79.0 LOW FERRITIN: Primary | ICD-10-CM

## 2025-06-26 PROCEDURE — 99284 EMERGENCY DEPT VISIT MOD MDM: CPT

## 2025-06-26 PROCEDURE — 72131 CT LUMBAR SPINE W/O DYE: CPT

## 2025-06-26 RX ORDER — NAPROXEN 500 MG/1
500 TABLET ORAL 2 TIMES DAILY WITH MEALS
Qty: 30 TABLET | Refills: 0 | Status: SHIPPED | OUTPATIENT
Start: 2025-06-26

## 2025-06-26 RX ORDER — GABAPENTIN 300 MG/1
300 CAPSULE ORAL ONCE
Status: COMPLETED | OUTPATIENT
Start: 2025-06-26 | End: 2025-06-26

## 2025-06-26 RX ORDER — LIDOCAINE 50 MG/G
1 PATCH TOPICAL DAILY
Qty: 7 PATCH | Refills: 0 | Status: SHIPPED | OUTPATIENT
Start: 2025-06-26

## 2025-06-26 RX ORDER — LIDOCAINE 50 MG/G
1 PATCH TOPICAL ONCE
Status: DISCONTINUED | OUTPATIENT
Start: 2025-06-26 | End: 2025-06-26 | Stop reason: HOSPADM

## 2025-06-26 RX ORDER — ACETAMINOPHEN 325 MG/1
975 TABLET ORAL ONCE
Status: COMPLETED | OUTPATIENT
Start: 2025-06-26 | End: 2025-06-26

## 2025-06-26 RX ORDER — ACETAMINOPHEN 500 MG
500 TABLET ORAL EVERY 6 HOURS PRN
Qty: 30 TABLET | Refills: 0 | Status: SHIPPED | OUTPATIENT
Start: 2025-06-26

## 2025-06-26 RX ORDER — NAPROXEN 250 MG/1
500 TABLET ORAL ONCE
Status: COMPLETED | OUTPATIENT
Start: 2025-06-26 | End: 2025-06-26

## 2025-06-26 RX ADMIN — LIDOCAINE 5% 1 PATCH: 700 PATCH TOPICAL at 11:47

## 2025-06-26 RX ADMIN — Medication 2.5 MG: at 13:34

## 2025-06-26 RX ADMIN — ACETAMINOPHEN 975 MG: 325 TABLET ORAL at 13:07

## 2025-06-26 RX ADMIN — GABAPENTIN 300 MG: 300 CAPSULE ORAL at 11:46

## 2025-06-26 RX ADMIN — NAPROXEN 500 MG: 250 TABLET ORAL at 11:46

## 2025-06-26 NOTE — DISCHARGE INSTRUCTIONS
-Take Naprosyn as needed for pain. Do not take on an empty stomach. Do not combine with Motrin, Ibuprofen, or Advil (it is the same class of medication). Take ONLY 500 mg twice per day   -take gabapentin 300 mg three times per day   -take tylenol as needed  -use lidocaine patches as prescribed  -follow up with comprehensive spine program

## 2025-06-26 NOTE — ED PROVIDER NOTES
Time reflects when diagnosis was documented in both MDM as applicable and the Disposition within this note       Time User Action Codes Description Comment    6/26/2025  1:29 PM Gamal Huerta Add [R79.0] Low ferritin     6/26/2025  1:29 PM Gamal Huerta Add [M54.9] Back pain     6/26/2025  1:30 PM Gamal Huerta Add [M99.79,  M51.9] Foraminal stenosis due to intervertebral disc disease     6/26/2025  1:30 PM Gamal Huerta Add [M24.28] Ligamentum flavum hypertrophy           ED Disposition       ED Disposition   Discharge    Condition   Stable    Date/Time   u Jun 26, 2025  1:53 PM    Comment   Hayes Osman discharge to home/self care.                   Assessment & Plan       Medical Decision Making  See CT scan results. Pain did improve here after medications. Discussed referrals and pain meds for at home    I have discussed the plan to discharge pt from ED. The patient was discharged in stable condition.  Patient ambulated off the department.  Extensive return to emergency department precautions were discussed.  Follow up with appropriate providers including primary care physician was discussed.  Patient and/or their  primary decision maker expressed understanding.  Patient remained stable during entire emergency department stay.      Amount and/or Complexity of Data Reviewed  Radiology: ordered. Decision-making details documented in ED Course.    Risk  OTC drugs.  Prescription drug management.        ED Course as of 06/26/25 1407   Thu Jun 26, 2025   1327 CT spine lumbar without contrast  1. Moderate central, severe right subarticular, and severe right foraminal stenosis at L4-5 due to disc bulge, right subarticular/foraminal extrusion, moderate facet arthropathy, and ligamentum flavum redundancy.  2. There is also severe left foraminal stenosis at L3-4 due to foraminal disc osteophyte complex.         Medications   lidocaine (LIDODERM) 5 % patch 1 patch (1 patch Topical Medication Applied 6/26/25 1147)    naproxen (NAPROSYN) tablet 500 mg (500 mg Oral Given 6/26/25 1146)   gabapentin (NEURONTIN) capsule 300 mg (300 mg Oral Given 6/26/25 1146)   acetaminophen (TYLENOL) tablet 975 mg (975 mg Oral Given 6/26/25 1307)   oxyCODONE (ROXICODONE) split tablet 2.5 mg (2.5 mg Oral Given 6/26/25 1334)       ED Risk Strat Scores                    No data recorded        SBIRT 22yo+      Flowsheet Row Most Recent Value   Initial Alcohol Screen: US AUDIT-C     1. How often do you have a drink containing alcohol? 0 Filed at: 06/26/2025 1152   2. How many drinks containing alcohol do you have on a typical day you are drinking?  0 Filed at: 06/26/2025 1152   3b. FEMALE Any Age, or MALE 65+: How often do you have 4 or more drinks on one occassion? 0 Filed at: 06/26/2025 1152   Audit-C Score 0 Filed at: 06/26/2025 1152   RANJIT: How many times in the past year have you...    Used an illegal drug or used a prescription medication for non-medical reasons? Never Filed at: 06/26/2025 1152                            History of Present Illness       Chief Complaint   Patient presents with    Back Pain     Began 1 month ago. Pt has been seeing chiropractor without relief.        Past Medical History[1]   Past Surgical History[2]   Family History[3]   Social History[4]   E-Cigarette/Vaping    E-Cigarette Use Former User       E-Cigarette/Vaping Substances    Nicotine No     THC No     CBD No     Flavoring No     Other No     Unknown No       I have reviewed and agree with the history as documented.     74 YOM with PMH DM, HTN and HLD presents today with complaint of lower back pain x1 month. Reports that he has been managing the pain by going to the chiropractor. States that he called his PCP last week and was prescribed naprosyn which reports has been helping slightly. States he is taking naprosyn 1000 mg BID and gabapentin 600 mg BID. Reports that past 2 days he has been having increased pain. States mostly on his left side and radiates  down his left leg. Denies bowel/urinary incontinence or retention. No saddle anesthesia.         Review of Systems   Genitourinary:  Negative for difficulty urinating, dysuria and frequency.   Musculoskeletal:  Positive for back pain.           Objective       ED Triage Vitals   Temperature Pulse Blood Pressure Respirations SpO2 Patient Position - Orthostatic VS   06/26/25 1049 06/26/25 1049 06/26/25 1049 06/26/25 1049 06/26/25 1049 06/26/25 1049   98.3 °F (36.8 °C) 89 117/58 18 93 % Sitting      Temp Source Heart Rate Source BP Location FiO2 (%) Pain Score    06/26/25 1049 -- 06/26/25 1049 -- 06/26/25 1146    Oral  Right arm  10 - Worst Possible Pain      Vitals      Date and Time Temp Pulse SpO2 Resp BP Pain Score FACES Pain Rating User   06/26/25 1358 -- -- -- -- -- 8 --    06/26/25 1334 -- -- -- -- -- 10 - Worst Possible Pain --    06/26/25 1307 -- -- -- -- -- 10 - Worst Possible Pain --    06/26/25 1305 -- -- -- -- -- 10 - Worst Possible Pain --    06/26/25 1146 -- -- -- -- -- 10 - Worst Possible Pain --    06/26/25 1049 98.3 °F (36.8 °C) 89 93 % 18 117/58 -- -- NG            Physical Exam  Vitals and nursing note reviewed.   Constitutional:       General: He is not in acute distress.     Appearance: Normal appearance. He is well-developed.   HENT:      Head: Normocephalic and atraumatic.     Eyes:      Conjunctiva/sclera: Conjunctivae normal.       Cardiovascular:      Rate and Rhythm: Normal rate and regular rhythm.      Heart sounds: No murmur heard.  Pulmonary:      Effort: Pulmonary effort is normal. No respiratory distress.      Breath sounds: Normal breath sounds.   Abdominal:      Palpations: Abdomen is soft.      Tenderness: There is no abdominal tenderness.     Musculoskeletal:         General: No swelling. Normal range of motion.      Cervical back: Normal range of motion and neck supple.        Back:       Comments: Strength 5/5 in LE, sensation intact.      Skin:     General: Skin is warm  and dry.      Capillary Refill: Capillary refill takes less than 2 seconds.     Neurological:      Mental Status: He is alert.     Psychiatric:         Mood and Affect: Mood normal.         Results Reviewed       None            CT spine lumbar without contrast   Final Interpretation by Gunner Yuen MD (06/26 0943)      1. Moderate central, severe right subarticular, and severe right foraminal stenosis at L4-5 due to disc bulge, right subarticular/foraminal extrusion, moderate facet arthropathy, and ligamentum flavum redundancy.   2. There is also severe left foraminal stenosis at L3-4 due to foraminal disc osteophyte complex.      Workstation performed: [id number]             Procedures    ED Medication and Procedure Management   Prior to Admission Medications   Prescriptions Last Dose Informant Patient Reported? Taking?   NIFEdipine ER (ADALAT CC) 60 MG 24 hr tablet   No No   Sig: Take 1 tablet (60 mg total) by mouth daily   Silodosin 8 MG CAPS   No No   Sig: Take 1 capsule by mouth daily at bedtime   acetaminophen (TYLENOL) 650 mg CR tablet   No No   Sig: Take 1 tablet (650 mg total) by mouth every 8 (eight) hours as needed for moderate pain   budesonide-formoterol (Symbicort) 80-4.5 MCG/ACT inhaler   No No   Sig: Inhale 2 puffs 2 (two) times a day   camphor-menthol (SARNA) lotion   Yes No   Sig: Apply topically as needed for itching   carvedilol (COREG) 12.5 mg tablet   No No   Sig: Take 1 tablet (12.5 mg total) by mouth 2 (two) times a day with meals   clotrimazole-betamethasone (LOTRISONE) 1-0.05 % cream   No No   Sig: Apply topically 2 (two) times a day   cyanocobalamin (VITAMIN B-12) 1000 MCG tablet   No No   Sig: Take 1 tablet (1,000 mcg total) by mouth daily   eplerenone (INSPRA) 50 MG tablet  Self Yes No   Sig: Take 50 mg by mouth in the morning.   ergocalciferol (VITAMIN D2) 50,000 units   No No   Sig: Take 1 capsule (50,000 Units total) by mouth once a week   gabapentin (NEURONTIN) 100 mg  capsule   No No   Sig: Take 1 capsule (100 mg total) by mouth 3 (three) times daily after meals   insulin lispro (HumaLOG) 100 units/mL injection  Self Yes No   Sig: Inject under the skin in the morning and in the evening.   ketoconazole (NIZORAL) 2 % shampoo   Yes No   Sig: Apply topically once   latanoprost (XALATAN) 0.005 % ophthalmic solution   Yes No   Si drop daily at bedtime   lidocaine (LMX) 4 % cream  Self No No   Sig: Apply topically as needed for mild pain   loratadine (CLARITIN) 10 mg tablet   Yes No   Sig: Take 10 mg by mouth in the morning.   magnesium gluconate (MAGONATE) 500 mg tablet   No No   Sig: Take 1 tablet (500 mg total) by mouth 2 (two) times a day Please stop Magnesium Oxide   metFORMIN (GLUCOPHAGE) 500 mg tablet   No No   Sig: Take 1 tablet (500 mg total) by mouth 2 (two) times a day with meals Please stop metformin 1000 mg   naproxen (NAPROSYN) 500 mg tablet   No No   Sig: Take 1 tablet (500 mg total) by mouth 2 (two) times daily after meals (with meals)   olopatadine (PATANOL) 0.1 % ophthalmic solution   Yes No   Si drop in the morning and 1 drop in the evening.   pantoprazole (PROTONIX) 40 mg tablet   Yes No   Sig: Take 40 mg by mouth in the morning.   potassium chloride (MICRO-K) 10 MEQ CR capsule   No No   Sig: Take 1 capsule (10 mEq total) by mouth daily   rosuvastatin (CRESTOR) 5 mg tablet   No No   Sig: Take 1 tablet (5 mg total) by mouth daily   selenium sulfide (SELSUN) 2.5 % shampoo   Yes No   Sig: Apply topically daily as needed for dandruff   semaglutide, 2 mg/dose, (Ozempic) 8 mg/ mL injection pen   No No   Sig: Inject 0.75 mL (2 mg total) under the skin every 7 days   sildenafil (VIAGRA) 100 mg tablet   Yes No   Sig: Take 100 mg by mouth daily as needed for erectile dysfunction   sitaGLIPtin (JANUVIA) 100 mg tablet   No No   Sig: Take 1 tablet (100 mg total) by mouth daily after lunch   torsemide (DEMADEX) 10 mg tablet   No No   Sig: Take 1 tablet (10 mg total) by  mouth daily   triamcinolone (KENALOG) 0.1 % cream   Yes No   Sig: Apply topically in the morning and in the evening.   valsartan (DIOVAN) 80 mg tablet   No No   Sig: Take 1 tablet (80 mg total) by mouth daily      Facility-Administered Medications: None     Patient's Medications   Discharge Prescriptions    ACETAMINOPHEN (TYLENOL) 500 MG TABLET    Take 1 tablet (500 mg total) by mouth every 6 (six) hours as needed for mild pain or moderate pain       Start Date: 6/26/2025 End Date: --       Order Dose: 500 mg       Quantity: 30 tablet    Refills: 0    LIDOCAINE (LIDODERM) 5 %    Apply 1 patch topically daily over 12 hours Remove & Discard patch within 12 hours or as directed by MD       Start Date: 6/26/2025 End Date: --       Order Dose: 1 patch       Quantity: 7 patch    Refills: 0    NAPROXEN (NAPROSYN) 500 MG TABLET    Take 1 tablet (500 mg total) by mouth 2 (two) times a day with meals       Start Date: 6/26/2025 End Date: --       Order Dose: 500 mg       Quantity: 30 tablet    Refills: 0       ED SEPSIS DOCUMENTATION   Time reflects when diagnosis was documented in both MDM as applicable and the Disposition within this note       Time User Action Codes Description Comment    6/26/2025  1:29 PM Gamal Huerta Add [R79.0] Low ferritin     6/26/2025  1:29 PM Gamal Huerta Add [M54.9] Back pain     6/26/2025  1:30 PM Gamal Huerta Add [M99.79,  M51.9] Foraminal stenosis due to intervertebral disc disease     6/26/2025  1:30 PM Gamal Huerta Add [M24.28] Ligamentum flavum hypertrophy                      [1]   Past Medical History:  Diagnosis Date    Colon polyps     Diabetes mellitus (HCC)     Hyperlipidemia     Hypertension     Myocardial infarction (HCC)     Sleep apnea    [2]   Past Surgical History:  Procedure Laterality Date    COLONOSCOPY      [place] ESOPHAGOGASTRODUODENOSCOPY TRANSORAL DIAGNOSTIC N/A 10/13/2017    Procedure: EGD AND COLONOSCOPY;  Surgeon: Orlin Farmer MD;  Location: [facility];  Service:  Gastroenterology   [3] No family history on file.  [4]   Social History  Tobacco Use    Smoking status: Former    Smokeless tobacco: Never   Vaping Use    Vaping status: Former   Substance Use Topics    Alcohol use: No    Drug use: No        Gamal Huerta PA-C  06/26/25 2139

## 2025-06-27 ENCOUNTER — NURSE TRIAGE (OUTPATIENT)
Dept: PHYSICAL THERAPY | Facility: OTHER | Age: 75
End: 2025-06-27

## 2025-06-27 ENCOUNTER — TELEPHONE (OUTPATIENT)
Dept: PHYSICAL THERAPY | Facility: OTHER | Age: 75
End: 2025-06-27

## 2025-06-27 DIAGNOSIS — M54.50 ACUTE LEFT-SIDED LOW BACK PAIN, UNSPECIFIED WHETHER SCIATICA PRESENT: Primary | ICD-10-CM

## 2025-06-27 NOTE — TELEPHONE ENCOUNTER
Background - Initial Assessment  Clinical complaint: ED visit yesterday 06/26 due to Left Sided Lower Back Pain that began  approx 1 month ago (new flare up) and more severe lately. Hx of back pain due to herniated discs for years. It radiates down to the hips and both legs. Only one time radiated up to her upper back. No numbness or tingling sensation only cramps here and there. Seen by a chiropractor with no relief of symptoms. Pain is constant, worse with movement and certain positions especially, sitting. No recent fall, car accident or direct trauma to the back. Patient described it as stabbing. No previous back sx. He is taking Gabapentin and Naproxen.  Date of onset: apprx 1 month ago, worsened lately.  Frequency of pain: constant  Quality of pain: stabbing.    Protocols used: [facility] Protocol

## 2025-06-27 NOTE — TELEPHONE ENCOUNTER
Nurse completed the triage and NO RF s/s were present.  Referral entered for the [place] site and the contact/phone number information for the office was given to the patient as well.   Patients information was sent to the preferred site and pt was made aware that clerical would be calling to schedule the evaluation appointment. Nurse encouraged him to call the site if he does not hear from clerical beforehand. Patient Agreed.    Nurse also offered a call from the  counselor d/t SBT score. Patient declined. Patient was pleasant and appropriate during this encounter.   Patient did not voice any additional questions or concerns at this time.   Patient is aware current/past complaints, any relevant dx, additional referrals and treatment/options will be discussed at the time of his evaluation/consultation appointment.   Patient is in agreement with plan.    Patient was very appreciative of the call to complete the triage and place the referral.   Nurse wished him well and the [facility] referral was closed.

## 2025-06-27 NOTE — TELEPHONE ENCOUNTER
Call placed to the patient regarding the referral entered for  [facility].     Message left for the patient. Contact information and hours of operation provided.  Requested the patient to CB when available to discuss [facility] services.    This is the first attempt to reach the patient.  Referral deferred for f/u attempt per protocol.       NOTE: Referral entered for  PM. Appt. Scheduled for 7/2/25

## 2025-07-02 ENCOUNTER — CONSULT (OUTPATIENT)
Dept: PAIN MEDICINE | Facility: CLINIC | Age: 75
End: 2025-07-02
Payer: COMMERCIAL

## 2025-07-02 VITALS — BODY MASS INDEX: 36.22 KG/M2 | WEIGHT: 239 LBS | HEIGHT: 68 IN

## 2025-07-02 DIAGNOSIS — M51.9 FORAMINAL STENOSIS DUE TO INTERVERTEBRAL DISC DISEASE: ICD-10-CM

## 2025-07-02 DIAGNOSIS — M99.79 FORAMINAL STENOSIS DUE TO INTERVERTEBRAL DISC DISEASE: ICD-10-CM

## 2025-07-02 DIAGNOSIS — M54.16 LUMBAR RADICULITIS: Primary | ICD-10-CM

## 2025-07-02 DIAGNOSIS — M54.9 BACK PAIN: ICD-10-CM

## 2025-07-02 PROCEDURE — 99214 OFFICE O/P EST MOD 30 MIN: CPT | Performed by: ANESTHESIOLOGY

## 2025-07-02 NOTE — PROGRESS NOTES
Name: Hayes Osman      : 1950      MRN: [id number]  Encounter Provider: Robin Cristobal MD  Encounter Date: 2025   Encounter department: [facility] [place]  Assessment & Plan  Lumbar radiculitis         Back pain    Orders:    Ambulatory referral to Spine & Pain Management    Foraminal stenosis due to intervertebral disc disease    Orders:    Ambulatory referral to Spine & Pain Management      75yo M past medical history significant for diabetes, chronic low back and bilateral leg pain. No motor sensory deficits appreciated.      - patient not interested in interventional options including epidural steroid injection at this time.  He has had extensive physical therapy with mild improvement.     -At this time he is recommended to continue with his gabapentin and NSAID regimen.  He was recently asked to increase his gabapentin dosage which I agree with.    - He can continue taking Tylenol extra strength as needed as well as his other adjuvant agents including heat ice, topical creams and patches.     Patient can follow-up as needed if he reconsiders interventional modalities.    Reviewed family medicine, prior pain management office notes.    My impressions and treatment recommendations were discussed in detail with the patient who verbalized understanding and had no further questions.  Discharge instructions were provided. I personally saw and examined the patient and I agree with the above discussed plan of care.    History of Present Illness     Hayes Osman is a 74 y.o. male presenting for consultation at [facility] for exam and evaluation of chronic back and radiating L>R leg pain for greater than 1 year, worsening over the past several months. Pain started following a non work related injury >20 years ago. Over the past month, the intensity of pain has been Severe. Pain is currently 6/10. Pain does interfere with age appropriate activities of  "daily living. Pain is nearly constant, described as cramping, pressure-like with pins/needles. Patient denies weakness in the lower extremities. Assistance device used: Cane and Walker.    Worsening factors noted: standing, bending, walking.   Improving factors noted: lying down, resting.    Treatments tried:   PT: yes  Chiropractic therapy: yes  Injections: yes   Previous spine surgery: No    Anticoagulation: no    Medications tried:   Tylenol, NSAIDs, gabapentin    Review of Systems   Constitutional:  Negative for fever and unexpected weight change.   HENT:  Negative for trouble swallowing.    Eyes:  Negative for visual disturbance.   Respiratory:  Negative for shortness of breath and wheezing.    Cardiovascular:  Negative for chest pain and palpitations.   Gastrointestinal:  Negative for constipation, diarrhea and nausea.   Endocrine: Negative for cold intolerance, heat intolerance and polydipsia.   Genitourinary:  Negative for difficulty urinating and frequency.   Musculoskeletal:  Positive for arthralgias, back pain, gait problem and myalgias. Negative for joint swelling.   Skin:  Negative for rash.   Neurological:  Positive for weakness and numbness. Negative for dizziness, seizures, syncope and headaches.   Hematological:  Does not bruise/bleed easily.   Psychiatric/Behavioral:  Negative for decreased concentration, dysphoric mood and sleep disturbance. The patient is not nervous/anxious.      Medical History Reviewed by provider this encounter:     .  Medications Ordered Prior to Encounter[1]      Objective   Ht 5' 8\" (1.727 m)   Wt 108 kg (239 lb)   BMI 36.34 kg/m²      Pain Score:   6  Physical Exam  Constitutional: normal, well developed, well nourished, alert, in no distress and non-toxic and no overt pain behavior.  Eyes: anicteric  HEENT: grossly intact  Neck: supple, symmetric, trachea midline and no masses   Pulmonary: even and unlabored  Cardiovascular: No edema or pitting edema present  Skin: " Normal without rashes or lesions and well hydrated  Psychiatric: Mood and affect appropriate  Neurologic:  Motor function is grossly intact with no focal neurologic deficits   Musculoskeletal: nonantalgic gait    Radiology Results Review: I personally reviewed the following image studies in PACS and associated radiology reports: MRI spine. My interpretation of the radiology images/reports is: Lumbar MRI showed multilevel degenerative changes with moderate central canal stenosis at L4-5.  Severe left foraminal stenosis at L3-4.  Severe right foraminal stenosis at L4-5..           [1]   Current Outpatient Medications on File Prior to Visit   Medication Sig Dispense Refill    acetaminophen (TYLENOL) 500 mg tablet Take 1 tablet (500 mg total) by mouth every 6 (six) hours as needed for mild pain or moderate pain 30 tablet 0    acetaminophen (TYLENOL) 650 mg CR tablet Take 1 tablet (650 mg total) by mouth every 8 (eight) hours as needed for moderate pain 30 tablet 0    budesonide-formoterol (Symbicort) 80-4.5 MCG/ACT inhaler Inhale 2 puffs 2 (two) times a day 10.2 g 3    camphor-menthol (SARNA) lotion Apply topically as needed for itching      carvedilol (COREG) 12.5 mg tablet Take 1 tablet (12.5 mg total) by mouth 2 (two) times a day with meals 200 tablet 3    clotrimazole-betamethasone (LOTRISONE) 1-0.05 % cream Apply topically 2 (two) times a day 45 g 3    cyanocobalamin (VITAMIN B-12) 1000 MCG tablet Take 1 tablet (1,000 mcg total) by mouth daily 100 tablet 3    eplerenone (INSPRA) 50 MG tablet Take 50 mg by mouth in the morning.      ergocalciferol (VITAMIN D2) 50,000 units Take 1 capsule (50,000 Units total) by mouth once a week 12 capsule 2    gabapentin (NEURONTIN) 100 mg capsule Take 1 capsule (100 mg total) by mouth 3 (three) times daily after meals 90 capsule 3    insulin lispro (HumaLOG) 100 units/mL injection Inject under the skin in the morning and in the evening.      ketoconazole (NIZORAL) 2 % shampoo  Apply topically once      latanoprost (XALATAN) 0.005 % ophthalmic solution 1 drop daily at bedtime      lidocaine (Lidoderm) 5 % Apply 1 patch topically daily over 12 hours Remove & Discard patch within 12 hours or as directed by MD Faye patch 0    lidocaine (LMX) 4 % cream Apply topically as needed for mild pain 30 g 0    loratadine (CLARITIN) 10 mg tablet Take 10 mg by mouth in the morning.      magnesium gluconate (MAGONATE) 500 mg tablet Take 1 tablet (500 mg total) by mouth 2 (two) times a day Please stop Magnesium Oxide 180 tablet 3    metFORMIN (GLUCOPHAGE) 500 mg tablet Take 1 tablet (500 mg total) by mouth 2 (two) times a day with meals Please stop metformin 1000 mg 200 tablet 3    naproxen (NAPROSYN) 500 mg tablet Take 1 tablet (500 mg total) by mouth 2 (two) times daily after meals (with meals) 60 tablet 2    naproxen (Naprosyn) 500 mg tablet Take 1 tablet (500 mg total) by mouth 2 (two) times a day with meals 30 tablet 0    NIFEdipine ER (ADALAT CC) 60 MG 24 hr tablet Take 1 tablet (60 mg total) by mouth daily 100 tablet 3    olopatadine (PATANOL) 0.1 % ophthalmic solution 1 drop in the morning and 1 drop in the evening.      pantoprazole (PROTONIX) 40 mg tablet Take 40 mg by mouth in the morning.      potassium chloride (MICRO-K) 10 MEQ CR capsule Take 1 capsule (10 mEq total) by mouth daily 30 capsule 3    rosuvastatin (CRESTOR) 5 mg tablet Take 1 tablet (5 mg total) by mouth daily 100 tablet 3    selenium sulfide (SELSUN) 2.5 % shampoo Apply topically daily as needed for dandruff      semaglutide, 2 mg/dose, (Ozempic) 8 mg/ mL injection pen Inject 0.75 mL (2 mg total) under the skin every 7 days 9 mL 1    sildenafil (VIAGRA) 100 mg tablet Take 100 mg by mouth daily as needed for erectile dysfunction      Silodosin 8 MG CAPS Take 1 capsule by mouth daily at bedtime 100 capsule 3    sitaGLIPtin (JANUVIA) 100 mg tablet Take 1 tablet (100 mg total) by mouth daily after lunch 90 tablet 3    torsemide  (DEMADEX) 10 mg tablet Take 1 tablet (10 mg total) by mouth daily 100 tablet 3    triamcinolone (KENALOG) 0.1 % cream Apply topically in the morning and in the evening.      valsartan (DIOVAN) 80 mg tablet Take 1 tablet (80 mg total) by mouth daily 100 tablet 3     No current facility-administered medications on file prior to visit.

## 2025-07-14 ENCOUNTER — EVALUATION (OUTPATIENT)
Dept: PHYSICAL THERAPY | Facility: REHABILITATION | Age: 75
End: 2025-07-14
Attending: PHYSICAL THERAPIST
Payer: COMMERCIAL

## 2025-07-14 DIAGNOSIS — M54.50 ACUTE LEFT-SIDED LOW BACK PAIN, UNSPECIFIED WHETHER SCIATICA PRESENT: ICD-10-CM

## 2025-07-14 PROCEDURE — 97110 THERAPEUTIC EXERCISES: CPT | Performed by: PHYSICAL THERAPIST

## 2025-07-14 PROCEDURE — 97162 PT EVAL MOD COMPLEX 30 MIN: CPT | Performed by: PHYSICAL THERAPIST

## 2025-07-14 NOTE — PROGRESS NOTES
PT Evaluation     Today's date: 2025  Patient name: Hayes Osman  : 1950  MRN: [id number]  Referring provider: Martinez Duncan PT  Dx:   Encounter Diagnosis     ICD-10-CM    1. Acute left-sided low back pain, unspecified whether sciatica present  M54.50 Ambulatory referral to PT spine                     Assessment  Impairments: abnormal muscle tone, abnormal or restricted ROM, impaired physical strength, lacks appropriate home exercise program, pain with function and poor posture   Symptom irritability: moderate    Assessment details: Pt is a 73 yo male with an acute exacerbation of chronic LBP.  Pt reports that he does not do house or yard work because of his chronic back problem.  He recently received several chiropractic adjustments and notes that he is about 80% better.  He presents with poor posture, limited lumbar ROM, pain limiting functional activities and mild weakness.  He would benefit from physical therapy to improve his core strength and general fitness to improve his level of function.    Understanding of Dx/Px/POC: excellent     Prognosis: good    Goals  STG: in 4 weeks  Pt performing HEP consistently  Pt able to do light core exercises without increased pain  LTG: by d/c  Pt able to do 30 min of exercise without increased pain  Pt able to sit 30 min without increased pain  Pt able to grocery shop without increased pain    Plan  Patient would benefit from: skilled physical therapy    Planned therapy interventions: neuromuscular re-education, strengthening, stretching, therapeutic activities, therapeutic exercise and home exercise program    Frequency: 2x week  Duration in weeks: 8  Plan of Care expiration date: 2025  Treatment plan discussed with: patient        Subjective Evaluation    History of Present Illness  Mechanism of injury: Low back pain began about 2 weeks ago.  He went to the ER and received pain medication.  Imaging revealed a HNP.   He received chiropractic  treatment and he is 80% better.    He is currently waiting for approval for continued care.  Pain had radiated down both LE but is no longer doing so.          Objective     Postural Observations  Seated posture: fair  Standing posture: fair  Correction of posture: makes symptoms better      Palpation   Left   Tenderness of the erector spinae.     Right   No palpable tenderness to the erector spinae.   Tenderness of the erector spinae.     Tenderness     Lumbar Spine  Tenderness in the spinous process (L3,4,5).     Active Range of Motion     Lumbar   Flexion:  with pain Restriction level: moderate  Extension:  with pain Restriction level: moderate  Left lateral flexion:  with pain Restriction level: moderate  Right lateral flexion:  with pain Restriction level: moderate    Joint Play     Hypomobile: L1, L2, L3, L4, L5 and S1     Pain: L4 and L5   Mechanical Assessment    Cervical      Thoracic      Lumbar    Standing extension: repeated movements  Pain location: no change  Pain intensity: worse  Lying extension: repeated movements  Pain location: no change  Pain intensity: worse    Strength/Myotome Testing     Left Hip   Planes of Motion   Flexion: 4    Right Hip   Planes of Motion   Flexion: 3+ (pain)    Left Knee   Flexion: 5  Extension: 5    Right Knee   Flexion: 5  Extension: 5    Left Ankle/Foot   Dorsiflexion: 5  Inversion: 5  Great toe extension: 5    Right Ankle/Foot   Dorsiflexion: 5  Inversion: 5  Great toe extension: 5    Tests     Lumbar   Negative sacral spring .     Left   Negative crossed SLR, passive SLR and slump test.     Right   Negative crossed SLR, passive SLR and slump test.     Left Pelvic Girdle/Sacrum   Negative: thigh thrust.     Right Pelvic Girdle/Sacrum   Negative: thigh thrust.     Left Hip   Positive MICHELLE.   Negative FADIR.     Right Hip   Negative MICHELLE and FADIR.              Precautions: Problem List[1]    Pt reports that PT exercises in the past have increased pain  Daily  "Treatment Diary     Assessment 7/14            Eval/Reval x            FOTO x    **     **   HEP Issued  x            Manuals             KENDY molina L3-5                                                    Exercise Diary              TM             TA 5\"x10            90/90 HS 1x10 ea            TA w/ball press             TA w/ hip add             TA w/hip abd             TA w/ shoulder ext                                       Posture instruction lumbar roll in sitting.                                                                                            Modalities                                      Access Code: [email or web address]  URL: [email or web address]  Date: 07/14/2025  Prepared by: Donn Cavanaugh    Exercises  - Supine Transversus Abdominis Bracing - Hands on Stomach  - 1 x daily - 7 x weekly - 1 sets - 10 reps - 5 sec hold  - Supine Hamstring Stretch  - 1 x daily - 7 x weekly - 1 sets - 10 reps         [1]   Patient Active Problem List  Diagnosis    Low ferritin     "

## 2025-07-22 ENCOUNTER — APPOINTMENT (OUTPATIENT)
Dept: PHYSICAL THERAPY | Facility: REHABILITATION | Age: 75
End: 2025-07-22
Attending: PHYSICAL THERAPIST
Payer: COMMERCIAL

## 2025-07-23 ENCOUNTER — OFFICE VISIT (OUTPATIENT)
Dept: FAMILY MEDICINE CLINIC | Facility: CLINIC | Age: 75
End: 2025-07-23
Payer: COMMERCIAL

## 2025-07-23 ENCOUNTER — TELEPHONE (OUTPATIENT)
Age: 75
End: 2025-07-23

## 2025-07-23 VITALS
HEIGHT: 68 IN | RESPIRATION RATE: 17 BRPM | OXYGEN SATURATION: 97 % | TEMPERATURE: 97.8 F | DIASTOLIC BLOOD PRESSURE: 72 MMHG | BODY MASS INDEX: 37.5 KG/M2 | SYSTOLIC BLOOD PRESSURE: 140 MMHG | HEART RATE: 74 BPM | WEIGHT: 247.4 LBS

## 2025-07-23 DIAGNOSIS — E78.5 HYPERLIPIDEMIA ASSOCIATED WITH TYPE 2 DIABETES MELLITUS  (HCC): ICD-10-CM

## 2025-07-23 DIAGNOSIS — Z12.12 SCREENING FOR COLORECTAL CANCER: ICD-10-CM

## 2025-07-23 DIAGNOSIS — M54.16 LUMBAR RADICULOPATHY: ICD-10-CM

## 2025-07-23 DIAGNOSIS — E11.69 HYPERLIPIDEMIA ASSOCIATED WITH TYPE 2 DIABETES MELLITUS  (HCC): ICD-10-CM

## 2025-07-23 DIAGNOSIS — J43.8 OTHER EMPHYSEMA (HCC): ICD-10-CM

## 2025-07-23 DIAGNOSIS — F41.9 ANXIETY: Primary | ICD-10-CM

## 2025-07-23 DIAGNOSIS — N52.8 OTHER MALE ERECTILE DYSFUNCTION: ICD-10-CM

## 2025-07-23 DIAGNOSIS — Z12.11 SCREENING FOR COLORECTAL CANCER: ICD-10-CM

## 2025-07-23 DIAGNOSIS — E11.8 TYPE II DIABETES MELLITUS WITH MANIFESTATIONS (HCC): ICD-10-CM

## 2025-07-23 PROCEDURE — 99214 OFFICE O/P EST MOD 30 MIN: CPT | Performed by: INTERNAL MEDICINE

## 2025-07-23 PROCEDURE — 96372 THER/PROPH/DIAG INJ SC/IM: CPT

## 2025-07-23 RX ORDER — SILDENAFIL 100 MG/1
100 TABLET, FILM COATED ORAL DAILY PRN
Qty: 10 TABLET | Refills: 3 | Status: SHIPPED | OUTPATIENT
Start: 2025-07-23

## 2025-07-23 RX ORDER — DIAZEPAM 5 MG/1
TABLET ORAL
Qty: 5 TABLET | Refills: 0 | Status: SHIPPED | OUTPATIENT
Start: 2025-07-23

## 2025-07-23 RX ORDER — ETODOLAC 400 MG/1
400 TABLET, FILM COATED ORAL
Qty: 60 TABLET | Refills: 1 | Status: SHIPPED | OUTPATIENT
Start: 2025-07-23

## 2025-07-23 RX ORDER — KETOROLAC TROMETHAMINE 30 MG/ML
60 INJECTION, SOLUTION INTRAMUSCULAR; INTRAVENOUS ONCE
Status: COMPLETED | OUTPATIENT
Start: 2025-07-23 | End: 2025-07-23

## 2025-07-23 RX ORDER — BUDESONIDE AND FORMOTEROL FUMARATE DIHYDRATE 80; 4.5 UG/1; UG/1
2 AEROSOL RESPIRATORY (INHALATION) 2 TIMES DAILY
Qty: 10.2 G | Refills: 3 | Status: SHIPPED | OUTPATIENT
Start: 2025-07-23

## 2025-07-23 RX ORDER — ALBUTEROL SULFATE 90 UG/1
2 INHALANT RESPIRATORY (INHALATION) EVERY 6 HOURS PRN
Qty: 18 G | Refills: 3 | Status: SHIPPED | OUTPATIENT
Start: 2025-07-23

## 2025-07-23 RX ORDER — LANOLIN ALCOHOL/MO/W.PET/CERES
400 CREAM (GRAM) TOPICAL
Qty: 90 TABLET | Refills: 3 | Status: SHIPPED | OUTPATIENT
Start: 2025-07-23

## 2025-07-23 RX ADMIN — KETOROLAC TROMETHAMINE 60 MG: 30 INJECTION, SOLUTION INTRAMUSCULAR; INTRAVENOUS at 10:34

## 2025-07-23 NOTE — PROGRESS NOTES
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  Try;  Orders:    Ambulatory Referral to Neurosurgery; Future    ketorolac (TORADOL) 60 mg/2 mL IM injection 60 mg    etodolac (LODINE) 400 MG tablet; Take 1 tablet (400 mg total) by mouth 2 (two) times daily after meals Please STOP Naprosyn    diazepam (VALIUM) 5 mg tablet; Take one tab about 1 hour before flight    magnesium Oxide (MAG-OX) 400 mg TABS; Take 1 tablet (400 mg total) by mouth daily at bedtime    Hyperlipidemia associated with type 2 diabetes mellitus  (HCC)    Lab Results   Component Value Date    HGBA1C 7.7 (A) 2025     Life style mod  Continue same  RTC in 3mos w Blood work         Anxiety  Try;  Orders:    diazepam (VALIUM) 5 mg tablet; Take one tab about 1 hour before flight    Other male erectile dysfunction  Renew;  Orders:    sildenafil (VIAGRA) 100 mg tablet; Take 1 tablet (100 mg total) by mouth daily as needed for erectile dysfunction           History of Present Illness   74 Y O man is here for Regular check Up, he  has few symptoms, no Homicidal nor suicidal Ideas, NO recent Blood work , Med list reviewed...      Review of Systems   Constitutional:  Negative for chills, fatigue and fever.   HENT:  Negative for congestion, facial swelling, sore throat, trouble swallowing and voice change.    Eyes:  Negative for pain, discharge and visual disturbance.   Respiratory:  Negative for  "cough, shortness of breath and wheezing.    Cardiovascular:  Negative for chest pain, palpitations and leg swelling.   Gastrointestinal:  Negative for abdominal pain, blood in stool, constipation, diarrhea and nausea.   Endocrine: Negative for polydipsia, polyphagia and polyuria.   Genitourinary:  Negative for difficulty urinating, hematuria and urgency.   Musculoskeletal:  Positive for back pain. Negative for arthralgias and myalgias.   Skin:  Negative for rash.   Neurological:  Positive for numbness. Negative for dizziness, tremors, weakness and headaches.   Hematological:  Negative for adenopathy. Does not bruise/bleed easily.   Psychiatric/Behavioral:  Positive for sleep disturbance. Negative for dysphoric mood and suicidal ideas. The patient is nervous/anxious.        Objective   /72 (BP Location: Left arm, Patient Position: Sitting, Cuff Size: Large)   Pulse 74   Temp 97.8 °F (36.6 °C) (Temporal)   Resp 17   Ht 5' 8\" (1.727 m)   Wt 112 kg (247 lb 6.4 oz)   SpO2 97%   BMI 37.62 kg/m²      Physical Exam  Vitals and nursing note reviewed.   Constitutional:       General: He is not in acute distress.     Appearance: He is well-developed.   HENT:      Head: Normocephalic and atraumatic.      Right Ear: External ear normal.      Left Ear: External ear normal.     Eyes:      Conjunctiva/sclera: Conjunctivae normal.      Pupils: Pupils are equal, round, and reactive to light.     Neck:      Thyroid: No thyromegaly.      Trachea: No tracheal deviation.     Cardiovascular:      Rate and Rhythm: Normal rate and regular rhythm.      Heart sounds: Murmur heard.      No friction rub.   Pulmonary:      Effort: Pulmonary effort is normal. No respiratory distress.      Breath sounds: Normal breath sounds. No wheezing.   Abdominal:      General: Bowel sounds are normal. There is no distension.      Palpations: Abdomen is soft.      Tenderness: There is no abdominal tenderness.     Musculoskeletal:         General: " Tenderness present. No swelling or deformity. Normal range of motion.      Cervical back: Neck supple.     Skin:     General: Skin is warm and dry.      Capillary Refill: Capillary refill takes less than 2 seconds.      Findings: No erythema or rash.     Neurological:      Mental Status: He is alert and oriented to person, place, and time.      Cranial Nerves: No cranial nerve deficit.      Sensory: Sensory deficit present.      Coordination: Coordination normal.      Deep Tendon Reflexes: Reflexes normal.     Psychiatric:         Mood and Affect: Mood normal.         Behavior: Behavior normal.

## 2025-07-23 NOTE — TELEPHONE ENCOUNTER
Patient called in stating missing Albuterol refill medication not called in to the pharmacy. But I am unable to find the medication on the listing. Kindly help. Thanks

## 2025-07-23 NOTE — PROGRESS NOTES
Name: Hayes Osman      : 1950      MRN: [id number]  Encounter Provider: Ken Caldwell MD  Encounter Date: 2025   Encounter department: [facility] CARE [facility]  :  Assessment & Plan       Preventive health issues were discussed with patient, and age appropriate screening tests were ordered as noted in patient's After Visit Summary. Personalized health advice and appropriate referrals for health education or preventive services given if needed, as noted in patient's After Visit Summary.    History of Present Illness     HPI   Patient Care Team:  Ken Caldwell MD as PCP - General (Internal Medicine)  Ken Caldwell MD as PCP - PCP-[facility] ([facility])    Review of Systems  Medical History Reviewed by provider this encounter:  Tobacco  Allergies  Meds  Problems  Med Hx  Surg Hx  Fam Hx       Annual Wellness Visit Questionnaire   Hayes is here for his Subsequent Wellness visit.     Health Risk Assessment:   Patient rates overall health as fair. Patient feels that their physical health rating is same. Patient is satisfied with their life. Eyesight was rated as same. Hearing was rated as same. Patient feels that their emotional and mental health rating is same. Patients states they are never, rarely angry. Patient states they are never, rarely unusually tired/fatigued. Pain experienced in the last 7 days has been none. Patient states that he has experienced no weight loss or gain in last 6 months.     Depression Screening:   PHQ-2 Score: 0      Fall Risk Screening:   In the past year, patient has experienced: no history of falling in past year      Home Safety:  Patient does not have trouble with stairs inside or outside of their home. Patient has no working smoke alarms and has working carbon monoxide detector. Home safety hazards include: none.     Medications:   Patient is not currently taking any over-the-counter supplements. Patient is able to manage medications.      Activities of Daily Living (ADLs)/Instrumental Activities of Daily Living (IADLs):   Walk and transfer into and out of bed and chair?: Yes  Dress and groom yourself?: Yes    Bathe or shower yourself?: Yes    Feed yourself? Yes  Do your laundry/housekeeping?: Yes  Manage your money, pay your bills and track your expenses?: Yes  Make your own meals?: Yes    Do your own shopping?: Yes    Previous Hospitalizations:   Any hospitalizations or ED visits within the last 12 months?: Yes    How many hospitalizations have you had in the last year?: 1-2    Advance Care Planning:   Living will: No    Durable POA for healthcare: No    Advanced directive counseling given: No    Five wishes given: No      Preventive Screenings      Cardiovascular Screening:    General: Screening Not Indicated, History Lipid Disorder and Risks and Benefits Discussed      Diabetes Screening:     General: Screening Not Indicated, History Diabetes, Risks and Benefits Discussed and Screening Current      Colorectal Cancer Screening:     General: Screening Current      Prostate Cancer Screening:    General: Screening Current and Risks and Benefits Discussed      Osteoporosis Screening:    General: Screening Not Indicated, History Osteoporosis and Risks and Benefits Discussed      Abdominal Aortic Aneurysm (AAA) Screening:    Risk factors include: age between 65-76 yo and tobacco use        General: Risks and Benefits Discussed      Lung Cancer Screening:     General: Screening Not Indicated and Risks and Benefits Discussed      Hepatitis C Screening:    General: Screening Current and Risks and Benefits Discussed    Immunizations:  - Immunizations due: Zoster (Shingrix)    Other Counseling Topics:   Car/seat belt/driving safety, skin self-exam, sunscreen and regular weightbearing exercise and calcium and vitamin D intake.     Social Drivers of Health     Financial Resource Strain: Low Risk  (4/23/2024)    Received from [facility]     "Overall Financial Resource Strain (CARDIA)     Difficulty of Paying Living Expenses: Not very hard   Food Insecurity: No Food Insecurity (7/23/2025)    Nursing - Inadequate Food Risk Classification     Worried About Running Out of Food in the Last Year: Never true     Ran Out of Food in the Last Year: Never true   Transportation Needs: No Transportation Needs (7/23/2025)    PRAPARE - Transportation     Lack of Transportation (Medical): No     Lack of Transportation (Non-Medical): No   Housing Stability: Low Risk  (7/23/2025)    Housing Stability Vital Sign     Unable to Pay for Housing in the Last Year: No     Number of Times Moved in the Last Year: 0     Homeless in the Last Year: No   Utilities: Not At Risk (7/23/2025)    [facility] Utilities     Threatened with loss of utilities: No     No results found.    Objective   /72 (BP Location: Left arm, Patient Position: Sitting, Cuff Size: Large)   Pulse 74   Temp 97.8 °F (36.6 °C) (Temporal)   Resp 17   Ht 5' 8\" (1.727 m)   Wt 112 kg (247 lb 6.4 oz)   SpO2 97%   BMI 37.62 kg/m²     Physical Exam    "

## 2025-07-23 NOTE — PATIENT INSTRUCTIONS
"Patient Education     Routine physical for adults   The Basics   Written by the doctors and editors at [facility]   What is a physical? -- A physical is a routine visit, or \"check-up,\" with your doctor. You might also hear it called a \"wellness visit\" or \"preventive visit.\"  During each visit, the doctor will:   Ask about your physical and mental health   Ask about your habits, behaviors, and lifestyle   Do an exam   Give you vaccines if needed   Talk to you about any medicines you take   Give advice about your health   Answer your questions  Getting regular check-ups is an important part of taking care of your health. It can help your doctor find and treat any problems you have. But it's also important for preventing health problems.  A routine physical is different from a \"sick visit.\" A sick visit is when you see a doctor because of a health concern or problem. Since physicals are scheduled ahead of time, you can think about what you want to ask the doctor.  How often should I get a physical? -- It depends on your age and health. In general, for people age 21 years and older:   If you are younger than 50 years, you might be able to get a physical every 3 years.   If you are 50 years or older, your doctor might recommend a physical every year.  If you have an ongoing health condition, like diabetes or high blood pressure, your doctor will probably want to see you more often.  What happens during a physical? -- In general, each visit will include:   Physical exam - The doctor or nurse will check your height, weight, heart rate, and blood pressure. They will also look at your eyes and ears. They will ask about how you are feeling and whether you have any symptoms that bother you.   Medicines - It's a good idea to bring a list of all the medicines you take to each doctor visit. Your doctor will talk to you about your medicines and answer any questions. Tell them if you are having any side effects that bother you. You " "should also tell them if you are having trouble paying for any of your medicines.   Habits and behaviors - This includes:   Your diet   Your exercise habits   Whether you smoke, drink alcohol, or use drugs   Whether you are sexually active   Whether you feel safe at home  Your doctor will talk to you about things you can do to improve your health and lower your risk of health problems. They will also offer help and support. For example, if you want to quit smoking, they can give you advice and might prescribe medicines. If you want to improve your diet or get more physical activity, they can help you with this, too.   Lab tests, if needed - The tests you get will depend on your age and situation. For example, your doctor might want to check your:   Cholesterol   Blood sugar   Iron level   Vaccines - The recommended vaccines will depend on your age, health, and what vaccines you already had. Vaccines are very important because they can prevent certain serious or deadly infections.   Discussion of screening - \"Screening\" means checking for diseases or other health problems before they cause symptoms. Your doctor can recommend screening based on your age, risk, and preferences. This might include tests to check for:   Cancer, such as breast, prostate, cervical, ovarian, colorectal, prostate, lung, or skin cancer   Sexually transmitted infections, such as chlamydia and gonorrhea   Mental health conditions like depression and anxiety  Your doctor will talk to you about the different types of screening tests. They can help you decide which screenings to have. They can also explain what the results might mean.   Answering questions - The physical is a good time to ask the doctor or nurse questions about your health. If needed, they can refer you to other doctors or specialists, too.  Adults older than 65 years often need other care, too. As you get older, your doctor will talk to you about:   How to prevent falling at " home   Hearing or vision tests   Memory testing   How to take your medicines safely   Making sure that you have the help and support you need at home  All topics are updated as new evidence becomes available and our peer review process is complete.  This topic retrieved from [employer] on: May 02, 2024.  Topic 477584 Version 1.0  Release: 32.4.3 - C32.122  © 2024 UpToDate, Inc. and/or its affiliates. All rights reserved.  Consumer Information Use and Disclaimer   Disclaimer: This generalized information is a limited summary of diagnosis, treatment, and/or medication information. It is not meant to be comprehensive and should be used as a tool to help the user understand and/or assess potential diagnostic and treatment options. It does NOT include all information about conditions, treatments, medications, side effects, or risks that may apply to a specific patient. It is not intended to be medical advice or a substitute for the medical advice, diagnosis, or treatment of a health care provider based on the health care provider's examination and assessment of a patient's specific and unique circumstances. Patients must speak with a health care provider for complete information about their health, medical questions, and treatment options, including any risks or benefits regarding use of medications. This information does not endorse any treatments or medications as safe, effective, or approved for treating a specific patient. UpToDate, Inc. and its affiliates disclaim any warranty or liability relating to this information or the use thereof.The use of this information is governed by the Terms of Use, available at https://www.woltersXMLAWuwer.com/en/know/clinical-effectiveness-terms. 2024© UpToDate, Inc. and its affiliates and/or licensors. All rights reserved.  Copyright   © 2024 UpToDate, Inc. and/or its affiliates. All rights reserved.

## 2025-07-24 ENCOUNTER — TELEPHONE (OUTPATIENT)
Age: 75
End: 2025-07-24

## 2025-07-24 ENCOUNTER — TELEPHONE (OUTPATIENT)
Dept: FAMILY MEDICINE CLINIC | Facility: CLINIC | Age: 75
End: 2025-07-24

## 2025-07-24 NOTE — TELEPHONE ENCOUNTER
PA for Ozempic 2MG/DOSE 8MG/3ML SUBMITTED to [employer]    via    []CMM-KEY:   [x]Surescripts-Case ID # [id number]   []Availity-Auth ID # NDC #   []Faxed to plan   []Other website   []Phone call Case ID #     [x]PA sent as URGENT    All office notes, labs and other pertaining documents and studies sent. Clinical questions answered. Awaiting determination from insurance company.     Turnaround time for your insurance to make a decision on your Prior Authorization can take 7-21 business days.

## 2025-07-24 NOTE — TELEPHONE ENCOUNTER
PA for Ozempic 2MG/DOSE 8MG/3ML APPROVED     Date(s) approved 07/24/2025-12/31/2025    Case #[id number]     Patient advised by          []MyChart Message  []Phone call   []LMOM  []L/M to call office as no active Communication consent on file  []Unable to leave detailed message as VM not approved on Communication consent       Pharmacy advised by    [x]Fax  []Phone call  []Secure Chat       Approval letter scanned into [employer] Yes

## 2025-07-24 NOTE — TELEPHONE ENCOUNTER
Patient called in regards to his magnesium. Patient stated that provider was supposed to send in an oil or cream instead of a pill as patient is taking too many medications and does not want to take another pill.

## 2025-07-25 NOTE — TELEPHONE ENCOUNTER
Spoke to the patient.  Informed him that he can get Mg oil or cream OTC.  He verbalized understanding.

## 2025-08-12 PROBLEM — M47.816 LUMBAR SPONDYLOSIS: Status: ACTIVE | Noted: 2025-08-12

## 2025-08-14 ENCOUNTER — CONSULT (OUTPATIENT)
Dept: NEUROSURGERY | Facility: CLINIC | Age: 75
End: 2025-08-14
Attending: INTERNAL MEDICINE
Payer: COMMERCIAL

## 2025-08-15 ENCOUNTER — NURSE TRIAGE (OUTPATIENT)
Age: 75
End: 2025-08-15

## 2025-08-18 ENCOUNTER — TELEPHONE (OUTPATIENT)
Dept: UROLOGY | Facility: MEDICAL CENTER | Age: 75
End: 2025-08-18

## 2025-08-19 ENCOUNTER — OFFICE VISIT (OUTPATIENT)
Dept: FAMILY MEDICINE CLINIC | Facility: CLINIC | Age: 75
End: 2025-08-19
Payer: COMMERCIAL

## 2025-08-19 ENCOUNTER — HOSPITAL ENCOUNTER (OUTPATIENT)
Dept: RADIOLOGY | Facility: HOSPITAL | Age: 75
Discharge: HOME | End: 2025-08-19
Payer: COMMERCIAL

## 2025-08-19 VITALS
SYSTOLIC BLOOD PRESSURE: 128 MMHG | DIASTOLIC BLOOD PRESSURE: 80 MMHG | HEIGHT: 68 IN | TEMPERATURE: 97.6 F | BODY MASS INDEX: 37.53 KG/M2 | OXYGEN SATURATION: 97 % | HEART RATE: 60 BPM | WEIGHT: 247.6 LBS | RESPIRATION RATE: 14 BRPM

## 2025-08-19 DIAGNOSIS — M19.90 ARTHRITIS: ICD-10-CM

## 2025-08-19 DIAGNOSIS — R60.0 LOCALIZED EDEMA: ICD-10-CM

## 2025-08-19 DIAGNOSIS — J32.8 OTHER CHRONIC SINUSITIS: ICD-10-CM

## 2025-08-19 DIAGNOSIS — R00.2 PALPITATIONS: ICD-10-CM

## 2025-08-19 DIAGNOSIS — I50.32 CHRONIC DIASTOLIC (CONGESTIVE) HEART FAILURE (HCC): ICD-10-CM

## 2025-08-19 DIAGNOSIS — M19.90 ARTHRITIS: Primary | ICD-10-CM

## 2025-08-19 DIAGNOSIS — M54.16 LUMBAR RADICULOPATHY: Primary | ICD-10-CM

## 2025-08-19 PROCEDURE — 96372 THER/PROPH/DIAG INJ SC/IM: CPT | Performed by: INTERNAL MEDICINE

## 2025-08-19 PROCEDURE — 73130 X-RAY EXAM OF HAND: CPT

## 2025-08-19 PROCEDURE — 99214 OFFICE O/P EST MOD 30 MIN: CPT | Performed by: INTERNAL MEDICINE

## 2025-08-19 RX ORDER — TORSEMIDE 10 MG/1
10 TABLET ORAL DAILY
Qty: 100 TABLET | Refills: 3 | Status: SHIPPED | OUTPATIENT
Start: 2025-08-19

## 2025-08-19 RX ORDER — FUROSEMIDE 10 MG/ML
40 INJECTION INTRAMUSCULAR; INTRAVENOUS ONCE
Status: COMPLETED | OUTPATIENT
Start: 2025-08-19 | End: 2025-08-19

## 2025-08-19 RX ORDER — CELECOXIB 200 MG/1
200 CAPSULE ORAL
Qty: 90 CAPSULE | Refills: 2 | Status: SHIPPED | OUTPATIENT
Start: 2025-08-19

## 2025-08-19 RX ORDER — LANOLIN ALCOHOL/MO/W.PET/CERES
400 CREAM (GRAM) TOPICAL
Qty: 90 TABLET | Refills: 3 | Status: SHIPPED | OUTPATIENT
Start: 2025-08-19

## 2025-08-19 RX ADMIN — FUROSEMIDE 40 MG: 10 INJECTION INTRAMUSCULAR; INTRAVENOUS at 10:32
